# Patient Record
Sex: MALE | Race: WHITE | NOT HISPANIC OR LATINO
[De-identification: names, ages, dates, MRNs, and addresses within clinical notes are randomized per-mention and may not be internally consistent; named-entity substitution may affect disease eponyms.]

---

## 2020-08-14 ENCOUNTER — TRANSCRIPTION ENCOUNTER (OUTPATIENT)
Age: 50
End: 2020-08-14

## 2020-09-22 ENCOUNTER — INPATIENT (INPATIENT)
Facility: HOSPITAL | Age: 50
LOS: 7 days | Discharge: HOME | DRG: 385 | End: 2020-09-30
Attending: HOSPITALIST | Admitting: STUDENT IN AN ORGANIZED HEALTH CARE EDUCATION/TRAINING PROGRAM
Payer: MEDICARE

## 2020-09-22 VITALS
WEIGHT: 179.9 LBS | SYSTOLIC BLOOD PRESSURE: 120 MMHG | DIASTOLIC BLOOD PRESSURE: 75 MMHG | TEMPERATURE: 99 F | RESPIRATION RATE: 18 BRPM | HEIGHT: 72 IN | HEART RATE: 85 BPM | OXYGEN SATURATION: 99 %

## 2020-09-22 LAB
ALBUMIN SERPL ELPH-MCNC: 3.2 G/DL — LOW (ref 3.3–5)
ALP SERPL-CCNC: 38 U/L — LOW (ref 40–120)
ALT FLD-CCNC: 19 U/L — SIGNIFICANT CHANGE UP (ref 10–45)
ANION GAP SERPL CALC-SCNC: 12 MMOL/L — SIGNIFICANT CHANGE UP (ref 5–17)
APTT BLD: 30 SEC — SIGNIFICANT CHANGE UP (ref 27.5–35.5)
AST SERPL-CCNC: 11 U/L — SIGNIFICANT CHANGE UP (ref 10–40)
BASE EXCESS BLDV CALC-SCNC: 1.3 MMOL/L — SIGNIFICANT CHANGE UP (ref -2–2)
BASE EXCESS BLDV CALC-SCNC: 5.3 MMOL/L — HIGH (ref -2–2)
BASOPHILS # BLD AUTO: 0 K/UL — SIGNIFICANT CHANGE UP (ref 0–0.2)
BASOPHILS NFR BLD AUTO: 0 % — SIGNIFICANT CHANGE UP (ref 0–2)
BILIRUB SERPL-MCNC: 0.6 MG/DL — SIGNIFICANT CHANGE UP (ref 0.2–1.2)
BUN SERPL-MCNC: 21 MG/DL — SIGNIFICANT CHANGE UP (ref 7–23)
CA-I SERPL-SCNC: 1.19 MMOL/L — SIGNIFICANT CHANGE UP (ref 1.12–1.3)
CA-I SERPL-SCNC: 1.2 MMOL/L — SIGNIFICANT CHANGE UP (ref 1.12–1.3)
CALCIUM SERPL-MCNC: 9.4 MG/DL — SIGNIFICANT CHANGE UP (ref 8.4–10.5)
CHLORIDE BLDV-SCNC: 96 MMOL/L — SIGNIFICANT CHANGE UP (ref 96–108)
CHLORIDE BLDV-SCNC: 97 MMOL/L — SIGNIFICANT CHANGE UP (ref 96–108)
CHLORIDE SERPL-SCNC: 95 MMOL/L — LOW (ref 96–108)
CO2 BLDV-SCNC: 29 MMOL/L — SIGNIFICANT CHANGE UP (ref 22–30)
CO2 BLDV-SCNC: 34 MMOL/L — HIGH (ref 22–30)
CO2 SERPL-SCNC: 28 MMOL/L — SIGNIFICANT CHANGE UP (ref 22–31)
CREAT SERPL-MCNC: 1.2 MG/DL — SIGNIFICANT CHANGE UP (ref 0.5–1.3)
EOSINOPHIL # BLD AUTO: 0 K/UL — SIGNIFICANT CHANGE UP (ref 0–0.5)
EOSINOPHIL NFR BLD AUTO: 0 % — SIGNIFICANT CHANGE UP (ref 0–6)
GAS PNL BLDV: 129 MMOL/L — LOW (ref 135–145)
GAS PNL BLDV: 129 MMOL/L — LOW (ref 135–145)
GAS PNL BLDV: SIGNIFICANT CHANGE UP
GLUCOSE BLDV-MCNC: 101 MG/DL — HIGH (ref 70–99)
GLUCOSE BLDV-MCNC: 119 MG/DL — HIGH (ref 70–99)
GLUCOSE SERPL-MCNC: 117 MG/DL — HIGH (ref 70–99)
HCO3 BLDV-SCNC: 28 MMOL/L — SIGNIFICANT CHANGE UP (ref 21–29)
HCO3 BLDV-SCNC: 32 MMOL/L — HIGH (ref 21–29)
HCT VFR BLD CALC: 35.8 % — LOW (ref 39–50)
HCT VFR BLDA CALC: 35 % — LOW (ref 39–50)
HCT VFR BLDA CALC: 36 % — LOW (ref 39–50)
HGB BLD CALC-MCNC: 11.4 G/DL — LOW (ref 13–17)
HGB BLD CALC-MCNC: 11.5 G/DL — LOW (ref 13–17)
HGB BLD-MCNC: 11 G/DL — LOW (ref 13–17)
HOROWITZ INDEX BLDV+IHG-RTO: SIGNIFICANT CHANGE UP
INR BLD: 1.36 RATIO — HIGH (ref 0.88–1.16)
LACTATE BLDV-MCNC: 2.6 MMOL/L — HIGH (ref 0.7–2)
LACTATE BLDV-MCNC: 3.6 MMOL/L — HIGH (ref 0.7–2)
LIDOCAIN IGE QN: 14 U/L — SIGNIFICANT CHANGE UP (ref 7–60)
LYMPHOCYTES # BLD AUTO: 0.62 K/UL — LOW (ref 1–3.3)
LYMPHOCYTES # BLD AUTO: 6 % — LOW (ref 13–44)
MANUAL SMEAR VERIFICATION: SIGNIFICANT CHANGE UP
MCHC RBC-ENTMCNC: 26.8 PG — LOW (ref 27–34)
MCHC RBC-ENTMCNC: 30.7 GM/DL — LOW (ref 32–36)
MCV RBC AUTO: 87.1 FL — SIGNIFICANT CHANGE UP (ref 80–100)
MONOCYTES # BLD AUTO: 1.23 K/UL — HIGH (ref 0–0.9)
MONOCYTES NFR BLD AUTO: 12 % — SIGNIFICANT CHANGE UP (ref 2–14)
NEUTROPHILS # BLD AUTO: 8.21 K/UL — HIGH (ref 1.8–7.4)
NEUTROPHILS NFR BLD AUTO: 65 % — SIGNIFICANT CHANGE UP (ref 43–77)
NEUTS BAND # BLD: 15 % — HIGH (ref 0–8)
NRBC # BLD: 0 /100 — SIGNIFICANT CHANGE UP (ref 0–0)
OTHER CELLS CSF MANUAL: 6 ML/DL — LOW (ref 18–22)
PCO2 BLDV: 56 MMHG — HIGH (ref 35–50)
PCO2 BLDV: 58 MMHG — HIGH (ref 35–50)
PH BLDV: 7.32 — LOW (ref 7.35–7.45)
PH BLDV: 7.35 — SIGNIFICANT CHANGE UP (ref 7.35–7.45)
PLAT MORPH BLD: NORMAL — SIGNIFICANT CHANGE UP
PLATELET # BLD AUTO: 452 K/UL — HIGH (ref 150–400)
PO2 BLDV: 29 MMHG — SIGNIFICANT CHANGE UP (ref 25–45)
PO2 BLDV: <20 MMHG — LOW (ref 25–45)
POTASSIUM BLDV-SCNC: 3.7 MMOL/L — SIGNIFICANT CHANGE UP (ref 3.5–5.3)
POTASSIUM BLDV-SCNC: 5.2 MMOL/L — SIGNIFICANT CHANGE UP (ref 3.5–5.3)
POTASSIUM SERPL-MCNC: 5.8 MMOL/L — HIGH (ref 3.5–5.3)
POTASSIUM SERPL-SCNC: 5.8 MMOL/L — HIGH (ref 3.5–5.3)
PROT SERPL-MCNC: 6.2 G/DL — SIGNIFICANT CHANGE UP (ref 6–8.3)
PROTHROM AB SERPL-ACNC: 16 SEC — HIGH (ref 10.6–13.6)
RBC # BLD: 4.11 M/UL — LOW (ref 4.2–5.8)
RBC # FLD: 17.6 % — HIGH (ref 10.3–14.5)
RBC BLD AUTO: SIGNIFICANT CHANGE UP
SAO2 % BLDV: 19 % — LOW (ref 67–88)
SAO2 % BLDV: 41 % — LOW (ref 67–88)
SODIUM SERPL-SCNC: 135 MMOL/L — SIGNIFICANT CHANGE UP (ref 135–145)
VARIANT LYMPHS # BLD: 2 % — SIGNIFICANT CHANGE UP (ref 0–6)
WBC # BLD: 10.26 K/UL — SIGNIFICANT CHANGE UP (ref 3.8–10.5)
WBC # FLD AUTO: 10.26 K/UL — SIGNIFICANT CHANGE UP (ref 3.8–10.5)

## 2020-09-22 PROCEDURE — 99285 EMERGENCY DEPT VISIT HI MDM: CPT | Mod: CS,GC

## 2020-09-22 PROCEDURE — 93010 ELECTROCARDIOGRAM REPORT: CPT

## 2020-09-22 PROCEDURE — 71045 X-RAY EXAM CHEST 1 VIEW: CPT | Mod: 26

## 2020-09-22 PROCEDURE — 74177 CT ABD & PELVIS W/CONTRAST: CPT | Mod: 26

## 2020-09-22 RX ORDER — METRONIDAZOLE 500 MG
500 TABLET ORAL ONCE
Refills: 0 | Status: COMPLETED | OUTPATIENT
Start: 2020-09-22 | End: 2020-09-22

## 2020-09-22 RX ORDER — SODIUM CHLORIDE 9 MG/ML
1000 INJECTION, SOLUTION INTRAVENOUS ONCE
Refills: 0 | Status: COMPLETED | OUTPATIENT
Start: 2020-09-22 | End: 2020-09-22

## 2020-09-22 RX ORDER — ACETAMINOPHEN 500 MG
975 TABLET ORAL ONCE
Refills: 0 | Status: COMPLETED | OUTPATIENT
Start: 2020-09-22 | End: 2020-09-22

## 2020-09-22 RX ORDER — CIPROFLOXACIN LACTATE 400MG/40ML
400 VIAL (ML) INTRAVENOUS ONCE
Refills: 0 | Status: COMPLETED | OUTPATIENT
Start: 2020-09-22 | End: 2020-09-23

## 2020-09-22 RX ORDER — MORPHINE SULFATE 50 MG/1
4 CAPSULE, EXTENDED RELEASE ORAL ONCE
Refills: 0 | Status: DISCONTINUED | OUTPATIENT
Start: 2020-09-22 | End: 2020-09-22

## 2020-09-22 RX ORDER — SODIUM CHLORIDE 9 MG/ML
1000 INJECTION INTRAMUSCULAR; INTRAVENOUS; SUBCUTANEOUS ONCE
Refills: 0 | Status: COMPLETED | OUTPATIENT
Start: 2020-09-22 | End: 2020-09-22

## 2020-09-22 RX ORDER — MORPHINE SULFATE 50 MG/1
2 CAPSULE, EXTENDED RELEASE ORAL ONCE
Refills: 0 | Status: DISCONTINUED | OUTPATIENT
Start: 2020-09-22 | End: 2020-09-22

## 2020-09-22 RX ADMIN — Medication 100 MILLIGRAM(S): at 23:09

## 2020-09-22 RX ADMIN — MORPHINE SULFATE 2 MILLIGRAM(S): 50 CAPSULE, EXTENDED RELEASE ORAL at 23:09

## 2020-09-22 RX ADMIN — SODIUM CHLORIDE 1000 MILLILITER(S): 9 INJECTION INTRAMUSCULAR; INTRAVENOUS; SUBCUTANEOUS at 20:47

## 2020-09-22 RX ADMIN — Medication 975 MILLIGRAM(S): at 23:04

## 2020-09-22 RX ADMIN — MORPHINE SULFATE 4 MILLIGRAM(S): 50 CAPSULE, EXTENDED RELEASE ORAL at 23:05

## 2020-09-22 RX ADMIN — MORPHINE SULFATE 4 MILLIGRAM(S): 50 CAPSULE, EXTENDED RELEASE ORAL at 20:07

## 2020-09-22 RX ADMIN — Medication 975 MILLIGRAM(S): at 19:45

## 2020-09-22 RX ADMIN — SODIUM CHLORIDE 1000 MILLILITER(S): 9 INJECTION, SOLUTION INTRAVENOUS at 20:07

## 2020-09-22 NOTE — ED PROVIDER NOTE - PHYSICAL EXAMINATION
PHYSICAL EXAM:  GENERAL: awake, alert, anxious, in moderate distress   HEAD:  Atraumatic  EYES: EOMI, PERRLA, conjunctiva and sclera clear  ENT: Dry mucous membranes  NECK: Supple  CHEST/LUNG: Clear to auscultation bilaterally; No rales  HEART: Regular rate and rhythm; No murmurs  ABDOMEN: Bowel sounds present; Soft, +b/l lower abdominal tenderness. No rebound/guarding   EXTREMITIES:  2+ Peripheral Pulses, brisk capillary refill. No edema   NERVOUS SYSTEM:  Alert & Oriented X3, speech clear. No deficits   MSK: FROM all 4 extremities, full and equal strength  SKIN: No rashes

## 2020-09-22 NOTE — ED PROVIDER NOTE - CLINICAL SUMMARY MEDICAL DECISION MAKING FREE TEXT BOX
This is a 50M hx ulcerative colitis, with lower abdominal pain x 1.5 months xelganz, prednisone 40mg qd (has been taking x 3 weeks), and oxycodone presenting with abdominal pain after being discharged recently for UC flare. Abdominal exam tenderness lower abdomen b/l, no rebound or guarding, hemodynamically stable. Will do full workup - CBC, CMP, VBG, EKG, CXR, CT abdomen/pelvis. NS 2L. Tylenol 975 PO and Morphine 4 mg IV for pain. Re-assess after meds and labs/imaging This is a 50M hx ulcerative colitis, with lower abdominal pain x 1.5 months xelganz, prednisone 40mg qd (has been taking x 3 weeks), and oxycodone presenting with abdominal pain after being discharged recently for UC flare. Abdominal exam tenderness lower abdomen b/l, no rebound or guarding, hemodynamically stable. Will do full workup - CBC, CMP, VBG, EKG, CXR, CT abdomen/pelvis. NS 2L. Tylenol 975 PO and Morphine 4 mg IV for pain. Re-assess after meds and labs/imaging. CT abdomen/pelvis ?concern for toxic megacolon, Surgery consulted, no acute intervention at this time.

## 2020-09-22 NOTE — ED PROVIDER NOTE - ATTENDING CONTRIBUTION TO CARE
RGUJRAL 51yo male hx CAD s/p PCI x3, Ulcerative colitis presents with abdominal pain. Patient was recently admitted to [facility] for similar symptoms, was admitted and discharged with steroids. States symptoms have not improved. He has decreased appetite and intake. Now feels dizzy and lightheaded. + Diarrhea with blood in stool since July. No hx blood transfusion. No fever, chills.   On exam, Patient is awake, alert and oriented x 3.  Patient is well appearing and in no acute distress.  NCAT  Dry MMM  Lungs are CTA B/L,+S1S2 no murmurs,  Abdomen:Soft nd/+L>R tenderness +bs no rebound or guarding.  Extremity no edema or calf tender.  Skin with no rash.  Neuro CN3-12 intact. Strength 5/5 in upper and lower extremities. Nml Sensation.Gait normal.   Check labs, CT. IVF and pain control and re eval. Likely admission for pain control and hydration.

## 2020-09-22 NOTE — ED PROVIDER NOTE - RAPID ASSESSMENT
50M hx ulcerative colitis, with lower abdominal pain x 1.5 months xelganz, prednisone 40mg qd (has been taking x 3 weeks), oxycodone. Was in hospital last week and release last tuesday [facility]. no fevers. bloody loose stools 2 x/day. no vomiting. no nausea. No dysuria. No changes in urination. today 830 am 5mg. Spoke to GI doctor who recommended he return to hospital as he is not improving and may have continued UC flare. No chest pain or sob. Complains of dizziness. Decreased appetite.     Gi: jhony taylor  cardiologist: yelena at [facility]  pmd: Verivue calli  allergies: penicillin (angioedema?)  Psxh: tonsillectomy  PMhx: MI 2014 s/p stents x 3 Becerril DO: 50M hx ulcerative colitis, with lower abdominal pain x 1.5 months xelganz, prednisone 40mg qd (has been taking x 3 weeks), oxycodone. Was in hospital last week and release last tuesday [facility]. no fevers. bloody loose stools 2 x/day. no vomiting. no nausea. No dysuria. No changes in urination. today 830 am 5mg. Spoke to GI doctor who recommended he return to hospital as he is not improving and may have continued UC flare. No chest pain or sob. Complains of dizziness. Decreased appetite.     Gi: jhony taylor  cardiologist: yelena at [facility]  pmd: [email or web address]  allergies: penicillin (angioedema?)  Psxh: tonsillectomy  PMhx: MI 2014 s/p stents x 3    nonsurgical abdomen on assessment, no rebound/guarding, ttp b/l lower quadrants, plan for labs, ct a/p to eval for complications of UC flare, tylenol and will likely need further pain control/ fluids once back in main ED. Becerril DO: 50M hx ulcerative colitis, with lower abdominal pain x 1.5 months xelganz, prednisone 40mg qd (has been taking x 3 weeks), oxycodone. Was in hospital last week and release last tuesday [facility]. no fevers. bloody loose stools 2 x/day. no vomiting. no nausea. No dysuria. No changes in urination. today 830 am 5mg. Spoke to GI doctor who recommended he return to hospital as he is not improving and may have continued UC flare. No chest pain or sob. Complains of dizziness. Decreased appetite.     Gi: jhony taylor  cardiologist: yelena at [facility]  pmd: [email or web address]  allergies: penicillin (angioedema?)  Psxh: tonsillectomy  PMhx: MI 2014 s/p stents x 3    nonsurgical abdomen on assessment, no rebound/guarding, ttp b/l lower quadrants, plan for labs, ct a/p to eval for complications of UC flare, tylenol and will likely need further pain control/ fluids once back in main ED. FS 92. conjunctiva pink. pt complaining of dizziness, [facility] Becerril DO: 50M hx ulcerative colitis, with lower abdominal pain x 1.5 months xelganz, prednisone 40mg qd (has been taking x 3 weeks), and oxycodone. Was in hospital last week for UC flare and released last tuesday from [facility]. no fevers. bloody loose stools 2 x/day. no vomiting. no nausea. No dysuria. No changes in urination. last oxycodone today 830 am 5mg. Spoke to GI doctor who recommended he return to hospital as he is not improving and may have continued UC flare. No chest pain or sob. Complains of dizziness. Decreased appetite.     Gi: jhony taylor  cardiologist: yelena at [facility]  pmd: [email or web address]  allergies: penicillin (angioedema?)  Psxh: tonsillectomy  PMhx: MI 2014 s/p stents x 3    nonsurgical abdomen on assessment, no rebound/guarding, ttp b/l lower quadrants, plan for labs, ct a/p to eval for complications of UC flare, tylenol and will likely need further pain control/ fluids once back in main ED. FS 92. conjunctiva pink. pt complaining of dizziness, spoke to main to expedite placement in maind ED

## 2020-09-22 NOTE — CONSULT NOTE ADULT - ASSESSMENT
50M hx MI s/p MAGDA x3 (2014), ulcerative colitis (on Xeljanz), with lower abdominal pain x 6 weeks presenting with likely UC flare    - No urgent surgical intervention indicated at this time  - Patient remains symptomatic despite treatment with 3 different biologic agents and steroids  - UC refractory to maximal medical therapy would likely benefit from proctocolectomy however ideally would be performed electively, without active inflammation  - Recommend GI consultation and medical optimization  - Further recommendations and possible surgery pending clinical course    d/w fellow Dr. Davis on behalf of Dr. Dennison    Colorectal Surgery (Red)  [id number] with questions

## 2020-09-22 NOTE — ED PROVIDER NOTE - NS ED ROS FT
REVIEW OF SYSTEMS:  CONSTITUTIONAL: No fever, chills  EYES: No eye pain  ENMT:  No difficulty hearing, tinnitus, vertigo  NECK: No pain   RESPIRATORY: No cough, wheezing, or hemoptysis; No shortness of breath  CARDIOVASCULAR: No chest pain, palpitations, or leg swelling  GASTROINTESTINAL: +abdominal pain and diarrhea. No nausea, vomiting, or hematemesis  GENITOURINARY: No dysuria  NEUROLOGICAL: No headaches  SKIN: No rashes   MUSCULOSKELETAL: No joint pain

## 2020-09-22 NOTE — ED ADULT NURSE NOTE - OBJECTIVE STATEMENT
The pt is a 49 y/o M PMH UC presenting to the ED c/o suprapubic pain, diarrhea, decreased PO intake x 1.5 monbths The patient reports he was in hospital 1 week ago for UC flare he was advised by his doctor to return to hospital after he has not improved. Upon assessment, pt is AO x 4, speaking in full and complete sentences, s1 s2 heart sounds, abd soft, tender to the suprapubic region, hyperactive bowel sounds present in all four quadrants, equal strength bilaterally, skin warm, dry and intact, present peripheral pulses, PERRL. Pt denies fever, chills, n/v, urinary symptoms, CP, dizziness, weakness, HA, SOB, abd pain. Pt positioned for comfort, appropriate side rails raised, wheels locked, bed in lowest position, pt denies needs at this time, call bell within reach. The pt is a 51 y/o M PMH UC presenting to the ED c/o suprapubic pain, diarrhea, decreased PO intake, bloody stools, 20 bowel movements a day x 1.5 monbths The patient reports he was in hospital 1 week ago for UC flare he was advised by his doctor to return to hospital after he has not improved. Upon assessment, pt is AO x 4, speaking in full and complete sentences, s1 s2 heart sounds, abd soft, tender to the suprapubic region, hyperactive bowel sounds present in all four quadrants, equal strength bilaterally, skin warm, dry and intact, present peripheral pulses, PERRL. Pt denies fever, chills, n/v, urinary symptoms, CP, dizziness, weakness, HA, SOB, abd pain. Pt positioned for comfort, appropriate side rails raised, wheels locked, bed in lowest position, pt denies needs at this time, call bell within reach.

## 2020-09-22 NOTE — ED PROVIDER NOTE - OBJECTIVE STATEMENT
This is a 50M hx ulcerative colitis, with lower abdominal pain x 1.5 months xelganz, prednisone 40mg qd (has been taking x 3 weeks), and oxycodone presenting with abdominal pain. Pt reports that he was discharged from [facility] last week for UC flare. At that time they did a colonoscopy which showed 2/3 of the colon severely inflamed. States that his lower abdominal pain has been constant since discharge, rated 9/10, not relieved with oxycodone.     No fevers, N/V, chest pain, dyspnea, dysuria. Reports 20 episodes of bloody loose stools for the last week. Reports dizziness described as lightheadedness, also has poor PO intake. Spoke to GI doctor who recommended he return to hospital as he is not improving and may have continued UC flare.

## 2020-09-23 ENCOUNTER — TRANSCRIPTION ENCOUNTER (OUTPATIENT)
Age: 50
End: 2020-09-23

## 2020-09-23 DIAGNOSIS — Z29.9 ENCOUNTER FOR PROPHYLACTIC MEASURES, UNSPECIFIED: ICD-10-CM

## 2020-09-23 DIAGNOSIS — K51.919 ULCERATIVE COLITIS, UNSPECIFIED WITH UNSPECIFIED COMPLICATIONS: ICD-10-CM

## 2020-09-23 DIAGNOSIS — D64.9 ANEMIA, UNSPECIFIED: ICD-10-CM

## 2020-09-23 DIAGNOSIS — K51.90 ULCERATIVE COLITIS, UNSPECIFIED, WITHOUT COMPLICATIONS: ICD-10-CM

## 2020-09-23 DIAGNOSIS — I25.10 ATHEROSCLEROTIC HEART DISEASE OF NATIVE CORONARY ARTERY WITHOUT ANGINA PECTORIS: ICD-10-CM

## 2020-09-23 LAB
ALBUMIN SERPL ELPH-MCNC: 2.6 G/DL — LOW (ref 3.3–5)
ALP SERPL-CCNC: 30 U/L — LOW (ref 40–120)
ALT FLD-CCNC: 14 U/L — SIGNIFICANT CHANGE UP (ref 10–45)
ANION GAP SERPL CALC-SCNC: 14 MMOL/L — SIGNIFICANT CHANGE UP (ref 5–17)
ANION GAP SERPL CALC-SCNC: 6 MMOL/L — SIGNIFICANT CHANGE UP (ref 5–17)
APTT BLD: 20.6 SEC — LOW (ref 27.5–35.5)
AST SERPL-CCNC: 8 U/L — LOW (ref 10–40)
BASE EXCESS BLDV CALC-SCNC: 4 MMOL/L — HIGH (ref -2–2)
BASOPHILS # BLD AUTO: 0.03 K/UL — SIGNIFICANT CHANGE UP (ref 0–0.2)
BASOPHILS NFR BLD AUTO: 0.4 % — SIGNIFICANT CHANGE UP (ref 0–2)
BILIRUB SERPL-MCNC: 0.6 MG/DL — SIGNIFICANT CHANGE UP (ref 0.2–1.2)
BUN SERPL-MCNC: 15 MG/DL — SIGNIFICANT CHANGE UP (ref 7–23)
BUN SERPL-MCNC: 21 MG/DL — SIGNIFICANT CHANGE UP (ref 7–23)
C DIFF GDH STL QL: NEGATIVE — SIGNIFICANT CHANGE UP
C DIFF GDH STL QL: SIGNIFICANT CHANGE UP
CA-I SERPL-SCNC: 1.14 MMOL/L — SIGNIFICANT CHANGE UP (ref 1.12–1.3)
CALCIUM SERPL-MCNC: 8.1 MG/DL — LOW (ref 8.4–10.5)
CALCIUM SERPL-MCNC: 8.7 MG/DL — SIGNIFICANT CHANGE UP (ref 8.4–10.5)
CHLORIDE BLDV-SCNC: 99 MMOL/L — SIGNIFICANT CHANGE UP (ref 96–108)
CHLORIDE SERPL-SCNC: 96 MMOL/L — SIGNIFICANT CHANGE UP (ref 96–108)
CHLORIDE SERPL-SCNC: 98 MMOL/L — SIGNIFICANT CHANGE UP (ref 96–108)
CO2 BLDV-SCNC: 29 MMOL/L — SIGNIFICANT CHANGE UP (ref 22–30)
CO2 SERPL-SCNC: 22 MMOL/L — SIGNIFICANT CHANGE UP (ref 22–31)
CO2 SERPL-SCNC: 27 MMOL/L — SIGNIFICANT CHANGE UP (ref 22–31)
CREAT SERPL-MCNC: 1.06 MG/DL — SIGNIFICANT CHANGE UP (ref 0.5–1.3)
CREAT SERPL-MCNC: 1.12 MG/DL — SIGNIFICANT CHANGE UP (ref 0.5–1.3)
CRP SERPL-MCNC: 10.27 MG/DL — HIGH (ref 0–0.4)
CULTURE RESULTS: SIGNIFICANT CHANGE UP
EOSINOPHIL # BLD AUTO: 0.14 K/UL — SIGNIFICANT CHANGE UP (ref 0–0.5)
EOSINOPHIL NFR BLD AUTO: 1.9 % — SIGNIFICANT CHANGE UP (ref 0–6)
ERYTHROCYTE [SEDIMENTATION RATE] IN BLOOD: 79 MM/HR — HIGH (ref 0–20)
GAS PNL BLDV: 129 MMOL/L — LOW (ref 135–145)
GAS PNL BLDV: SIGNIFICANT CHANGE UP
GLUCOSE BLDV-MCNC: 96 MG/DL — SIGNIFICANT CHANGE UP (ref 70–99)
GLUCOSE SERPL-MCNC: 96 MG/DL — SIGNIFICANT CHANGE UP (ref 70–99)
GLUCOSE SERPL-MCNC: 99 MG/DL — SIGNIFICANT CHANGE UP (ref 70–99)
HCO3 BLDV-SCNC: 28 MMOL/L — SIGNIFICANT CHANGE UP (ref 21–29)
HCT VFR BLD CALC: 30.5 % — LOW (ref 39–50)
HCT VFR BLDA CALC: 29 % — LOW (ref 39–50)
HGB BLD CALC-MCNC: 9.5 G/DL — LOW (ref 13–17)
HGB BLD-MCNC: 9.3 G/DL — LOW (ref 13–17)
IMM GRANULOCYTES NFR BLD AUTO: 1.1 % — SIGNIFICANT CHANGE UP (ref 0–1.5)
INR BLD: 1.22 RATIO — HIGH (ref 0.88–1.16)
LACTATE BLDV-MCNC: 1.1 MMOL/L — SIGNIFICANT CHANGE UP (ref 0.7–2)
LACTATE BLDV-MCNC: 1.1 MMOL/L — SIGNIFICANT CHANGE UP (ref 0.7–2)
LYMPHOCYTES # BLD AUTO: 0.61 K/UL — LOW (ref 1–3.3)
LYMPHOCYTES # BLD AUTO: 8.4 % — LOW (ref 13–44)
MAGNESIUM SERPL-MCNC: 1.9 MG/DL — SIGNIFICANT CHANGE UP (ref 1.6–2.6)
MCHC RBC-ENTMCNC: 26.4 PG — LOW (ref 27–34)
MCHC RBC-ENTMCNC: 30.5 GM/DL — LOW (ref 32–36)
MCV RBC AUTO: 86.6 FL — SIGNIFICANT CHANGE UP (ref 80–100)
MONOCYTES # BLD AUTO: 0.77 K/UL — SIGNIFICANT CHANGE UP (ref 0–0.9)
MONOCYTES NFR BLD AUTO: 10.6 % — SIGNIFICANT CHANGE UP (ref 2–14)
NEUTROPHILS # BLD AUTO: 5.64 K/UL — SIGNIFICANT CHANGE UP (ref 1.8–7.4)
NEUTROPHILS NFR BLD AUTO: 77.6 % — HIGH (ref 43–77)
NRBC # BLD: 0 /100 WBCS — SIGNIFICANT CHANGE UP (ref 0–0)
OTHER CELLS CSF MANUAL: 12 ML/DL — LOW (ref 18–22)
PCO2 BLDV: 39 MMHG — SIGNIFICANT CHANGE UP (ref 35–50)
PH BLDV: 7.46 — HIGH (ref 7.35–7.45)
PHOSPHATE SERPL-MCNC: 2 MG/DL — LOW (ref 2.5–4.5)
PLATELET # BLD AUTO: 352 K/UL — SIGNIFICANT CHANGE UP (ref 150–400)
PO2 BLDV: 52 MMHG — HIGH (ref 25–45)
POTASSIUM BLDV-SCNC: 3.8 MMOL/L — SIGNIFICANT CHANGE UP (ref 3.5–5.3)
POTASSIUM SERPL-MCNC: 3.9 MMOL/L — SIGNIFICANT CHANGE UP (ref 3.5–5.3)
POTASSIUM SERPL-MCNC: 4 MMOL/L — SIGNIFICANT CHANGE UP (ref 3.5–5.3)
POTASSIUM SERPL-SCNC: 3.9 MMOL/L — SIGNIFICANT CHANGE UP (ref 3.5–5.3)
POTASSIUM SERPL-SCNC: 4 MMOL/L — SIGNIFICANT CHANGE UP (ref 3.5–5.3)
PROT SERPL-MCNC: 5 G/DL — LOW (ref 6–8.3)
PROTHROM AB SERPL-ACNC: 14.4 SEC — HIGH (ref 10.6–13.6)
RBC # BLD: 3.52 M/UL — LOW (ref 4.2–5.8)
RBC # FLD: 17.9 % — HIGH (ref 10.3–14.5)
SAO2 % BLDV: 88 % — SIGNIFICANT CHANGE UP (ref 67–88)
SARS-COV-2 IGG SERPL QL IA: NEGATIVE — SIGNIFICANT CHANGE UP
SARS-COV-2 IGM SERPL IA-ACNC: <0.1 INDEX — SIGNIFICANT CHANGE UP
SARS-COV-2 RNA SPEC QL NAA+PROBE: SIGNIFICANT CHANGE UP
SODIUM SERPL-SCNC: 131 MMOL/L — LOW (ref 135–145)
SODIUM SERPL-SCNC: 132 MMOL/L — LOW (ref 135–145)
SPECIMEN SOURCE: SIGNIFICANT CHANGE UP
WBC # BLD: 7.27 K/UL — SIGNIFICANT CHANGE UP (ref 3.8–10.5)
WBC # FLD AUTO: 7.27 K/UL — SIGNIFICANT CHANGE UP (ref 3.8–10.5)

## 2020-09-23 PROCEDURE — 99223 1ST HOSP IP/OBS HIGH 75: CPT | Mod: GC

## 2020-09-23 RX ORDER — OXYCODONE HYDROCHLORIDE 5 MG/1
5 TABLET ORAL EVERY 6 HOURS
Refills: 0 | Status: DISCONTINUED | OUTPATIENT
Start: 2020-09-23 | End: 2020-09-24

## 2020-09-23 RX ORDER — OXYCODONE HYDROCHLORIDE 5 MG/1
5 TABLET ORAL EVERY 6 HOURS
Refills: 0 | Status: DISCONTINUED | OUTPATIENT
Start: 2020-09-23 | End: 2020-09-23

## 2020-09-23 RX ORDER — SODIUM CHLORIDE 9 MG/ML
1000 INJECTION, SOLUTION INTRAVENOUS
Refills: 0 | Status: DISCONTINUED | OUTPATIENT
Start: 2020-09-23 | End: 2020-09-28

## 2020-09-23 RX ORDER — HEPARIN SODIUM 5000 [USP'U]/ML
5000 INJECTION INTRAVENOUS; SUBCUTANEOUS EVERY 12 HOURS
Refills: 0 | Status: DISCONTINUED | OUTPATIENT
Start: 2020-09-23 | End: 2020-09-30

## 2020-09-23 RX ORDER — CIPROFLOXACIN LACTATE 400MG/40ML
400 VIAL (ML) INTRAVENOUS EVERY 12 HOURS
Refills: 0 | Status: DISCONTINUED | OUTPATIENT
Start: 2020-09-23 | End: 2020-09-23

## 2020-09-23 RX ORDER — TOFACITINIB 11 MG/1
1 TABLET, FILM COATED, EXTENDED RELEASE ORAL
Qty: 0 | Refills: 0 | DISCHARGE

## 2020-09-23 RX ORDER — MORPHINE SULFATE 50 MG/1
4 CAPSULE, EXTENDED RELEASE ORAL EVERY 6 HOURS
Refills: 0 | Status: DISCONTINUED | OUTPATIENT
Start: 2020-09-23 | End: 2020-09-23

## 2020-09-23 RX ORDER — TOFACITINIB 11 MG/1
10 TABLET, FILM COATED, EXTENDED RELEASE ORAL
Refills: 0 | Status: DISCONTINUED | OUTPATIENT
Start: 2020-09-23 | End: 2020-09-24

## 2020-09-23 RX ORDER — METRONIDAZOLE 500 MG
500 TABLET ORAL EVERY 8 HOURS
Refills: 0 | Status: DISCONTINUED | OUTPATIENT
Start: 2020-09-23 | End: 2020-09-23

## 2020-09-23 RX ORDER — ASPIRIN/CALCIUM CARB/MAGNESIUM 324 MG
81 TABLET ORAL DAILY
Refills: 0 | Status: DISCONTINUED | OUTPATIENT
Start: 2020-09-23 | End: 2020-09-30

## 2020-09-23 RX ORDER — SODIUM CHLORIDE 9 MG/ML
1000 INJECTION, SOLUTION INTRAVENOUS
Refills: 0 | Status: DISCONTINUED | OUTPATIENT
Start: 2020-09-23 | End: 2020-09-23

## 2020-09-23 RX ORDER — MORPHINE SULFATE 50 MG/1
4 CAPSULE, EXTENDED RELEASE ORAL EVERY 6 HOURS
Refills: 0 | Status: DISCONTINUED | OUTPATIENT
Start: 2020-09-23 | End: 2020-09-24

## 2020-09-23 RX ORDER — LANOLIN ALCOHOL/MO/W.PET/CERES
3 CREAM (GRAM) TOPICAL AT BEDTIME
Refills: 0 | Status: DISCONTINUED | OUTPATIENT
Start: 2020-09-23 | End: 2020-09-30

## 2020-09-23 RX ORDER — ASPIRIN/CALCIUM CARB/MAGNESIUM 324 MG
0 TABLET ORAL
Qty: 0 | Refills: 0 | DISCHARGE

## 2020-09-23 RX ORDER — SODIUM CHLORIDE 9 MG/ML
1000 INJECTION INTRAMUSCULAR; INTRAVENOUS; SUBCUTANEOUS ONCE
Refills: 0 | Status: COMPLETED | OUTPATIENT
Start: 2020-09-23 | End: 2020-09-23

## 2020-09-23 RX ORDER — ENOXAPARIN SODIUM 100 MG/ML
40 INJECTION SUBCUTANEOUS DAILY
Refills: 0 | Status: DISCONTINUED | OUTPATIENT
Start: 2020-09-23 | End: 2020-09-23

## 2020-09-23 RX ORDER — INFLUENZA VIRUS VACCINE 15; 15; 15; 15 UG/.5ML; UG/.5ML; UG/.5ML; UG/.5ML
0.5 SUSPENSION INTRAMUSCULAR ONCE
Refills: 0 | Status: DISCONTINUED | OUTPATIENT
Start: 2020-09-23 | End: 2020-09-30

## 2020-09-23 RX ADMIN — MORPHINE SULFATE 4 MILLIGRAM(S): 50 CAPSULE, EXTENDED RELEASE ORAL at 21:21

## 2020-09-23 RX ADMIN — Medication 20 MILLIGRAM(S): at 17:03

## 2020-09-23 RX ADMIN — MORPHINE SULFATE 4 MILLIGRAM(S): 50 CAPSULE, EXTENDED RELEASE ORAL at 04:22

## 2020-09-23 RX ADMIN — Medication 20 MILLIGRAM(S): at 17:02

## 2020-09-23 RX ADMIN — HEPARIN SODIUM 5000 UNIT(S): 5000 INJECTION INTRAVENOUS; SUBCUTANEOUS at 17:37

## 2020-09-23 RX ADMIN — SODIUM CHLORIDE 100 MILLILITER(S): 9 INJECTION, SOLUTION INTRAVENOUS at 14:41

## 2020-09-23 RX ADMIN — TOFACITINIB 10 MILLIGRAM(S): 11 TABLET, FILM COATED, EXTENDED RELEASE ORAL at 17:38

## 2020-09-23 RX ADMIN — MORPHINE SULFATE 4 MILLIGRAM(S): 50 CAPSULE, EXTENDED RELEASE ORAL at 13:10

## 2020-09-23 RX ADMIN — SODIUM CHLORIDE 100 MILLILITER(S): 9 INJECTION, SOLUTION INTRAVENOUS at 21:13

## 2020-09-23 RX ADMIN — MORPHINE SULFATE 4 MILLIGRAM(S): 50 CAPSULE, EXTENDED RELEASE ORAL at 12:58

## 2020-09-23 RX ADMIN — Medication 100 MILLIGRAM(S): at 14:41

## 2020-09-23 RX ADMIN — MORPHINE SULFATE 4 MILLIGRAM(S): 50 CAPSULE, EXTENDED RELEASE ORAL at 22:51

## 2020-09-23 RX ADMIN — Medication 20 MILLIGRAM(S): at 21:12

## 2020-09-23 RX ADMIN — Medication 200 MILLIGRAM(S): at 01:13

## 2020-09-23 RX ADMIN — Medication 40 MILLIGRAM(S): at 12:59

## 2020-09-23 RX ADMIN — OXYCODONE HYDROCHLORIDE 5 MILLIGRAM(S): 5 TABLET ORAL at 17:02

## 2020-09-23 RX ADMIN — OXYCODONE HYDROCHLORIDE 5 MILLIGRAM(S): 5 TABLET ORAL at 08:45

## 2020-09-23 RX ADMIN — MORPHINE SULFATE 2 MILLIGRAM(S): 50 CAPSULE, EXTENDED RELEASE ORAL at 01:13

## 2020-09-23 RX ADMIN — SODIUM CHLORIDE 1000 MILLILITER(S): 9 INJECTION INTRAMUSCULAR; INTRAVENOUS; SUBCUTANEOUS at 01:13

## 2020-09-23 RX ADMIN — Medication 81 MILLIGRAM(S): at 21:12

## 2020-09-23 RX ADMIN — Medication 3 MILLIGRAM(S): at 22:16

## 2020-09-23 RX ADMIN — OXYCODONE HYDROCHLORIDE 5 MILLIGRAM(S): 5 TABLET ORAL at 17:10

## 2020-09-23 RX ADMIN — OXYCODONE HYDROCHLORIDE 5 MILLIGRAM(S): 5 TABLET ORAL at 08:30

## 2020-09-23 NOTE — H&P ADULT - NSHPREVIEWOFSYSTEMS_GEN_ALL_CORE
REVIEW OF SYSTEMS:    CONSTITUTIONAL: No weakness, fevers or chills, + weight loss  EYES/ENT: No visual changes;  No vertigo or throat pain   NECK: No pain or stiffness  RESPIRATORY: No cough, wheezing, hemoptysis; No shortness of breath  CARDIOVASCULAR: No chest pain or palpitations  GASTROINTESTINAL: + abdominal pain. No nausea, vomiting, or hematemesis; + diarrhea or constipation. No melena. + hematochezia.  GENITOURINARY: No dysuria, frequency or hematuria  NEUROLOGICAL: No numbness or weakness  SKIN: No itching, rashes  psych: no anxiety or depression

## 2020-09-23 NOTE — H&P ADULT - NSICDXPASTMEDICALHX_GEN_ALL_CORE_FT
PAST MEDICAL HISTORY:  CAD (coronary artery disease)     MI (myocardial infarction)     Ulcerative colitis

## 2020-09-23 NOTE — DISCHARGE NOTE NURSING/CASE MANAGEMENT/SOCIAL WORK - NSDCPNINST_GEN_ALL_CORE
Call MD/911/ Go to emergency room if you have any chest pain, SOB, fevers, bleeding, severe abdominal pain, constipation, or blood in your stool. Or any change in status at all. Be sure to take medications as prescribed and follow up with MD as instructed.

## 2020-09-23 NOTE — ED ADULT NURSE REASSESSMENT NOTE - NS ED NURSE REASSESS COMMENT FT1
Pt updated on plan of care, clicked RTM awaiting inpatient bed. Pt positioned for comfort, wheels locked, bed in lowest position, appropriate side rails raised, call bell within reach.

## 2020-09-23 NOTE — H&P ADULT - NSHPSOCIALHISTORY_GEN_ALL_CORE
Never smoked  No alcohol or illicit drug use  Lives with wife and son  former [employer] officer, retired in 2014

## 2020-09-23 NOTE — CONSULT NOTE ADULT - ATTENDING COMMENTS
Ulcerative colitis with prolonged flare  -A long discussion with the patient on September 24, about the surgical intervention for ulcerative colitis. We discussed doing 3 stage procedures for total proctocolectomy with J-pouch creation. Given patient's current active flare and high steroid dependency, he would clearly require a 3 stage procedure. We discussed risks and benefits at length including bleeding, infection, risk of injury to nearby structures, possible anastomotic dehiscence, possible pouch failure and possible Crohn's conversion. We also discussed treatment options without proceeding with surgery including additional biologic therapy.  -Given patient's prolonged active inflammation, I believe he would be a good candidate for the initial stage of surgery being laparoscopic-assisted total abnormal colectomy.  This would ultimately be followed by completion proctectomy with J-pouch creation and ileostomy reversal  -All questions were answered  -Patient will consider his options and discuss further with his family and gastroenterology  -We will continue to monitor closely and be available to discuss any additional questions
Differential diagnosis and plan of care discussed with patient after the evaluation  75 Minutes spent on total encounter of which more than fifty percent of the encounter was spent counseling and/or coordinating care by the attending physician.  Advanced care planning was discussed with the patient and/or surrogate decision makers. Advanced care planning forms were discussed. The risks benefits and alternatives to pertinent gastrointestinal procedures and interventions were discussed in detail and all questions were answered. Duration: 30 Minutes.      Brandon Gray M.D.   Gastroenterology and Hepatology  Cell: [phone]

## 2020-09-23 NOTE — PROGRESS NOTE ADULT - PROBLEM SELECTOR PLAN 2
MAGDA x 3 (2014)  -c/w ASA 81mg MI s/p MAGDA x 3 (2014). Stable. Not on other anticoagulants or antiplatelets.  -c/w ASA 81mg MI s/p MAGDA x 3 (2014). Stable. Not on other anticoagulants or antiplatelets.  - d/w GI regarding any impending procedure, otherwise c/w ASA 81mg MI s/p MAGDA x 3 (2014). Stable. Not on other anticoagulants or antiplatelets.  - no impending procedure for now, c/w ASA 81mg

## 2020-09-23 NOTE — PROGRESS NOTE ADULT - SUBJECTIVE AND OBJECTIVE BOX
Rashel Blake, PGY1  Internal Medicine  Pager #: [phone]    Patient is a 50y old  Male who presents with a chief complaint of abdominal pain (23 Sep 2020 07:24)      SUBJECTIVE / OVERNIGHT EVENTS:  ADDITIONAL REVIEW OF SYSTEMS: 10 point ROS negative except as stated per HPI.    MEDICATIONS  (STANDING):  ciprofloxacin   IVPB 400 milliGRAM(s) IV Intermittent every 12 hours  enoxaparin Injectable 40 milliGRAM(s) SubCutaneous daily  influenza   Vaccine 0.5 milliLiter(s) IntraMuscular once  lactated ringers. 1000 milliLiter(s) (75 mL/Hr) IV Continuous <Continuous>  metroNIDAZOLE  IVPB 500 milliGRAM(s) IV Intermittent every 8 hours  predniSONE   Tablet 40 milliGRAM(s) Oral daily    MEDICATIONS  (PRN):  morphine  - Injectable 4 milliGRAM(s) IV Push every 6 hours PRN Severe Pain (7 - 10)      CAPILLARY BLOOD GLUCOSE      POCT Blood Glucose.: 92 mg/dL (22 Sep 2020 19:11)    I&O's Summary      PHYSICAL EXAM:  Vital Signs Last 24 Hrs  T(C): 36.9 (23 Sep 2020 05:27), Max: 37.1 (22 Sep 2020 16:01)  T(F): 98.5 (23 Sep 2020 05:27), Max: 98.8 (22 Sep 2020 16:01)  HR: 77 (23 Sep 2020 05:27) (62 - 85)  BP: 117/70 (23 Sep 2020 05:27) (115/68 - 147/79)  BP(mean): --  RR: 18 (23 Sep 2020 05:27) (18 - 18)  SpO2: 99% (23 Sep 2020 05:27) (99% - 100%)  CONSTITUTIONAL: NAD, lying in bed comfortably  EYES: EOMI, PERRLA; conjunctiva and sclera clear  ENMT: Moist oral mucosa; normal dentition  NECK: Supple, no palpable masses  RESPIRATORY: Lungs clear to ascultation b/l; No rales, ronchi, or wheezing; Unlabored respirations  CARDIOVASCULAR: Regular rate and rhythm, normal S1 and S2, no murmurs, rubs, or gallops  ABDOMEN: Soft, nontender, nondistended, normal bowel sounds  MUSCULOSKELETAL: No joint swelling or tenderness to palpation  PSYCH: Affect appropriate  NEUROLOGY: AAOx3, CNs grossly intact  SKIN: No rashes; no palpable lesions    LABS:                        11.0   10.26 )-----------( 452      ( 22 Sep 2020 19:22 )             35.8     09-22    132<L>  |  96  |  21  ----------------------------<  99  4.0   |  22  |  1.12    Ca    8.7      22 Sep 2020 23:25    TPro  6.2  /  Alb  3.2<L>  /  TBili  0.6  /  DBili  x   /  AST  11  /  ALT  19  /  AlkPhos  38<L>  09-22    PT/INR - ( 22 Sep 2020 19:22 )   PT: 16.0 sec;   INR: 1.36 ratio         PTT - ( 22 Sep 2020 19:22 )  PTT:30.0 sec            RADIOLOGY & ADDITIONAL TESTS: Rashel Blake, PGY1  Internal Medicine  Pager #: [phone]    Patient is a 50y old  Male who presents with a chief complaint of abdominal pain (23 Sep 2020 07:24)    SUBJECTIVE / OVERNIGHT EVENTS: Admitted overnight. Endorses ongoing abdominal pain, severely frequent diarrhea with hematochezia. Also reports increased abdominal distension and rectal spasms.  Reports similar symptoms ongoing prior to [facility] admission where CT abdomen & colonoscopy were performed, no med changes; reports symptoms did not improve during or after that admission. Reports he has been on Xeljans for several weeks with prednisone.     ADDITIONAL REVIEW OF SYSTEMS: 10 point ROS negative except as stated per HPI.    MEDICATIONS  (STANDING):  ciprofloxacin   IVPB 400 milliGRAM(s) IV Intermittent every 12 hours  enoxaparin Injectable 40 milliGRAM(s) SubCutaneous daily  influenza   Vaccine 0.5 milliLiter(s) IntraMuscular once  lactated ringers. 1000 milliLiter(s) (75 mL/Hr) IV Continuous <Continuous>  metroNIDAZOLE  IVPB 500 milliGRAM(s) IV Intermittent every 8 hours  predniSONE   Tablet 40 milliGRAM(s) Oral daily    MEDICATIONS  (PRN):  morphine  - Injectable 4 milliGRAM(s) IV Push every 6 hours PRN Severe Pain (7 - 10)    CAPILLARY BLOOD GLUCOSE      POCT Blood Glucose.: 92 mg/dL (22 Sep 2020 19:11)    I&O's Summary      PHYSICAL EXAM:  Vital Signs Last 24 Hrs  T(C): 36.9 (23 Sep 2020 05:27), Max: 37.1 (22 Sep 2020 16:01)  T(F): 98.5 (23 Sep 2020 05:27), Max: 98.8 (22 Sep 2020 16:01)  HR: 77 (23 Sep 2020 05:27) (62 - 85)  BP: 117/70 (23 Sep 2020 05:27) (115/68 - 147/79)  BP(mean): --  RR: 18 (23 Sep 2020 05:27) (18 - 18)  SpO2: 99% (23 Sep 2020 05:27) (99% - 100%)  CONSTITUTIONAL: NAD, lying in bed, appears uncomfortable  EYES: EOMI, PERRLA; conjunctiva and sclera clear  ENMT: Moist oral mucosa; normal dentition  NECK: Supple, no palpable masses  RESPIRATORY: Lungs clear to ascultation b/l; No rales, ronchi, or wheezing; Unlabored respirations  CARDIOVASCULAR: Regular rate and rhythm, normal S1 and S2, no murmurs, rubs, or gallops  ABDOMEN: Soft; mildly distended; tender to palpation at suprapubic, periumbilical regions with guarding; no rebound; normal bowel sounds  MUSCULOSKELETAL: No joint swelling or tenderness to palpation  PSYCH: Affect appropriate  NEUROLOGY: AAOx3, CNs grossly intact  SKIN: No rashes; no palpable lesions    LABS:                        11.0   10.26 )-----------( 452      ( 22 Sep 2020 19:22 )             35.8     09-22    132<L>  |  96  |  21  ----------------------------<  99  4.0   |  22  |  1.12    Ca    8.7      22 Sep 2020 23:25    TPro  6.2  /  Alb  3.2<L>  /  TBili  0.6  /  DBili  x   /  AST  11  /  ALT  19  /  AlkPhos  38<L>  09-22    PT/INR - ( 22 Sep 2020 19:22 )   PT: 16.0 sec;   INR: 1.36 ratio         PTT - ( 22 Sep 2020 19:22 )  PTT:30.0 sec        RADIOLOGY & ADDITIONAL TESTS:    t< from: CT Abdomen and Pelvis w/ IV Cont (09.22.20 @ 20:32) >  FINDINGS:    LOWER CHEST: Right lower lobe calcified granuloma. No pleural effusion.  LIVER: Within normal limits.  BILE DUCTS: Normal caliber.  GALLBLADDER: Within normal limits.  SPLEEN: Within normal limits.  PANCREAS: Within normal limits.  ADRENALS: Within normal limits.    KIDNEYS/URETERS: Within normal limits.  BLADDER: Within normal limits.  REPRODUCTIVE ORGANS: Prostate within normal limits.    BOWEL: Long segment continuous left-sided colonic wall thickening extending from the transverse colon to the rectum with pericolonic inflammatory changes, compatible with colitis. The transverse colon is distended with air up to 6 cm.  No bowel obstruction. Appendix is normal. No free air or abscess.    VESSELS: Atherosclerotic changes.  RETROPERITONEUM/LYMPH NODES: No lymphadenopathy.  ABDOMINAL WALL: Tiny fat-containing umbilical hernia.  BONES: Grade 2 anterolisthesis of L5 on S1 due to bilateral spondylolysis/pars defects. L5-S1 disc degeneration near complete loss of disc height and vacuum change. Mild thoracic spondylosis with small anterior osteophyte formation.  Intramedullary nail track in the proximal right femoral diaphysis is partially imaged. Heterotopic bone formation along the right greater trochanter noted.    IMPRESSION:    Long segment continuous left-sided colitis from the transverse colon to the rectum with pericolonic inflammatory changes, in keeping with history of ulcerative colitis. Air distended transverse colon. Correlate clinically for toxic megacolon.   08-Aug-2020

## 2020-09-23 NOTE — H&P ADULT - NSHPPHYSICALEXAM_GEN_ALL_CORE
VITALS:   T(C): 36.9 (09-23-20 @ 05:27), Max: 37.1 (09-22-20 @ 16:01)  HR: 77 (09-23-20 @ 05:27) (62 - 85)  BP: 117/70 (09-23-20 @ 05:27) (115/68 - 147/79)  RR: 18 (09-23-20 @ 05:27) (18 - 18)  SpO2: 99% (09-23-20 @ 05:27) (99% - 100%)    GENERAL: NAD, lying in bed comfortably  HEAD:  Atraumatic, Normocephalic  EYES: EOMI, PERRLA, conjunctiva and sclera clear  ENT: Moist mucous membranes  NECK: Supple, No JVD  CHEST/LUNG: Clear to auscultation bilaterally; No rales, rhonchi, wheezing, or rubs. Unlabored respirations  HEART: Regular rate and rhythm; No murmurs, rubs, or gallops  ABDOMEN: BSx4; Soft, nondistended, tender to palpation lower abdomen. no rebound or guarding   EXTREMITIES:  2+ Peripheral Pulses, brisk capillary refill. No clubbing, cyanosis, or edema  NERVOUS SYSTEM:  A&Ox3, no focal deficits   SKIN: No rashes or lesions  psych: flat affect

## 2020-09-23 NOTE — H&P ADULT - ASSESSMENT
50 year old male with pmhx MAGDA x 3 (2014), ulcerative colitis (on Xeljanz and prednisone) who is presenting with abdominal pain 2/2 UC

## 2020-09-23 NOTE — PROGRESS NOTE ADULT - SUBJECTIVE AND OBJECTIVE BOX
SURGERY DAILY PROGRESS NOTE:      S:   Patient seen and examined. No acute events overnight. Pain is well controlled. GI fxn +/+. Tolerating diet w/o N/V      O:   Exam:  Gen: NAD. A&Ox3.  Well developed, alert and cooperative.   Resp: No additional work of breathing.   Card: RR. No peripheral edema or pallor.   Abd: Soft, ND, NT. No rebound or guarding.   Ext: WWP. Able to move all extremities equally.    Vital Signs Last 24 Hrs  T(C): 36.9 (23 Sep 2020 05:27), Max: 37.1 (22 Sep 2020 16:01)  T(F): 98.5 (23 Sep 2020 05:27), Max: 98.8 (22 Sep 2020 16:01)  HR: 77 (23 Sep 2020 05:27) (62 - 85)  BP: 117/70 (23 Sep 2020 05:27) (115/68 - 147/79)  BP(mean): --  RR: 18 (23 Sep 2020 05:27) (18 - 18)  SpO2: 99% (23 Sep 2020 05:27) (99% - 100%)        LABS:                        11.0   10.26 )-----------( 452      ( 22 Sep 2020 19:22 )             35.8     09-22    132<L>  |  96  |  21  ----------------------------<  99  4.0   |  22  |  1.12    Ca    8.7      22 Sep 2020 23:25    TPro  6.2  /  Alb  3.2<L>  /  TBili  0.6  /  DBili  x   /  AST  11  /  ALT  19  /  AlkPhos  38<L>  09-22    PT/INR - ( 22 Sep 2020 19:22 )   PT: 16.0 sec;   INR: 1.36 ratio         PTT - ( 22 Sep 2020 19:22 )  PTT:30.0 sec

## 2020-09-23 NOTE — DISCHARGE NOTE NURSING/CASE MANAGEMENT/SOCIAL WORK - PATIENT PORTAL LINK FT
You can access the FollowMyHealth Patient Portal offered by [facility] by registering at the following website: [email or web address]. By joining [employer]’s FollowMyHealth portal, you will also be able to view your health information using other applications (apps) compatible with our system.

## 2020-09-23 NOTE — CONSULT NOTE ADULT - ASSESSMENT
Full note to follow  follow up cdiff, if negative would start IV methylprednisolone 20 mg TID    Differential diagnosis and plan of care discussed with patient after the evaluation  75 Minutes spent on total encounter of which more than fifty percent of the encounter was spent counseling and/or coordinating care by the attending physician.  Advanced care planning was discussed with the patient and/or surrogate decision makers. Advanced care planning forms were discussed. The risks benefits and alternatives to pertinent gastrointestinal procedures and interventions were discussed in detail and all questions were answered. Duration: 30 Minutes.      Brandon Gray M.D. (Covering for Dr. Cesar Meeks)  Gastroenterology and Hepatology  Cell: [phone] 50 M w UC on Xeljanz p/w severe UC

## 2020-09-23 NOTE — PROGRESS NOTE ADULT - ASSESSMENT
50 year old male with pmhx MAGDA x 3 (2014), ulcerative colitis (on Xeljanz and prednisone) who is presenting with abdominal pain 2/2 UC 50 year old male with hx of CAD, MI s/p MAGDA x 3 (March 2014), TIA (June 2014 w/o complications), ulcerative colitis (dx'ed 2010, on Xeljanz and prednisone) who is presenting with abdominal pain 2/2 UC 50 year old male with hx of CAD, MI s/p MAGDA x 3 (March 2014), TIA (June 2014 w/o complications), ulcerative colitis (dx'ed 2010, on Xeljanz and prednisone) who is presenting with abdominal pain 2/2 UC Flare 50 year old male with hx of CAD, MI s/p MAGDA x 3 (March 2014), TIA (June 2014 w/o complications), ulcerative colitis (dx'ed 2010, on Xeljanz and prednisone) who is presenting with abdominal pain 2/2 UC Flare.

## 2020-09-23 NOTE — H&P ADULT - PROBLEM SELECTOR PLAN 1
Dx UC 2010. Recently admitted to [place] for 6 weeks for management of UC symptoms. Colonoscopy showed inflammation throughout 2/3 of his colon. No surgical intervention at that time. Discharged on Xelijanz and prednisone 40mg qdaily. Now admitted for continued abdominal pain and diarrhea.  -s/p cipro and flagyl in the ED  -CT abdomen long segment continuous left-sided colitis from the transverse colon to the rectum with pericolonic inflammatory changes. Air distended transverse colon. Correlate clinically for toxic megacolon.  -seen by colorectal surgery, appreciate recs  -no urgent surgical intervention at this time  -would benefit from elective protocolectomy when inflammation has reduced  -GI consult   -pain control oxycodone 5mg q8hrs (outpatient regiment)  -c/w prednisone 40mg qdaily  -c/w Xeljanz Dx UC 2010. Recently admitted to [place] for management of UC symptoms. Colonoscopy showed inflammation throughout 2/3 of his colon. No surgical intervention at that time. Discharged on Xelijanz and prednisone 40mg qdaily. Now admitted for continued abdominal pain and diarrhea.  -s/p cipro and flagyl in the ED  -CT abdomen long segment continuous left-sided colitis from the transverse colon to the rectum with pericolonic inflammatory changes. Air distended transverse colon. Correlate clinically for toxic megacolon.  -seen by colorectal surgery, appreciate recs  -no urgent surgical intervention at this time  -would benefit from elective protocolectomy when inflammation has reduced  -GI consult   -pain control oxycodone 5mg q8hrs (outpatient regiment)  -c/w prednisone 40mg qdaily  -c/w Xeljanz  -c/w dicyclomine Dx UC 2010. Recently admitted to [place] for management of UC symptoms. Colonoscopy showed inflammation throughout 2/3 of his colon. No surgical intervention at that time. Discharged on Xelijanz and prednisone 40mg qdaily. Now admitted for continued abdominal pain and diarrhea.  -s/p cipro and flagyl in the ED  -CT abdomen long segment continuous left-sided colitis from the transverse colon to the rectum with pericolonic inflammatory changes. Air distended transverse colon. Correlate clinically for toxic megacolon.  -seen by colorectal surgery, appreciate recs  -no urgent surgical intervention at this time  -would benefit from elective protocolectomy when inflammation has reduced  -GI consult   -pain control with morphine   -c/w prednisone 40mg qdaily  -c/w dicyclomine  -c/w cipro/flagyl  -check c. diff   -IVF maintenance Dx UC 2010. Recently admitted to [place] for management of UC symptoms. Colonoscopy showed inflammation throughout 2/3 of his colon. No surgical intervention at that time. Discharged on Xelijanz and prednisone 40mg qdaily. Now admitted for continued abdominal pain and diarrhea.  -s/p cipro and flagyl in the ED  -CT abdomen long segment continuous left-sided colitis from the transverse colon to the rectum with pericolonic inflammatory changes. Air distended transverse colon. Correlate clinically for toxic megacolon.  -seen by colorectal surgery, appreciate recs  -no urgent surgical intervention at this time  -would strongly consider elective proctocolectomy when inflammation has reduced  -GI consult with Cesar Fry [phone]  -pain control with IV morphine   -c/w prednisone 40mg qdaily  -c/w dicyclomine  -c/w cipro/flagyl  -check c. diff   -IVF maintenance  -Continuation of home xeljanz as per GI, pt has meds with him. Dx UC 2010. Recently admitted to [place] for management of UC symptoms. Colonoscopy showed inflammation throughout 2/3 of his colon. No surgical intervention at that time. Discharged on Xelijanz and prednisone 40mg qdaily. Now admitted for continued abdominal pain and diarrhea. Failed multiple biologic agents.  -s/p cipro and flagyl in the ED  -CT abdomen long segment continuous left-sided colitis from the transverse colon to the rectum with pericolonic inflammatory changes. Air distended transverse colon. Correlate clinically for toxic megacolon.  -seen by colorectal surgery, appreciate recs  -no urgent surgical intervention at this time  -would strongly consider elective proctocolectomy when inflammation has reduced  -GI consult with Cesar Fry [phone]  -pain control with IV morphine   -c/w prednisone 40mg qdaily  -c/w dicyclomine  -c/w cipro/flagyl  -check c. diff   -IVF maintenance  -Continuation of home xeljanz as per GI, pt has meds with him.

## 2020-09-23 NOTE — H&P ADULT - ATTENDING COMMENTS
I have reviewed the labs, imaging and ekg. I have seen and examined the patient. I agree with above unless otherwise stated below.  50M w/ pmhx MAGDA x 3 (2014), ulcerative colitis (on Xeljanz and prednisone) p/w abdominal pain and diarrhea thought to be from worsening UC flare. Appreciate colo-rectal recommendations, strongly consider proctocolectomy.   -GI consult with Cesar Meeks [phone] in AM  -Will cont. IV cipro and flagyl for now  -Cont. IVF, f/u AM lactate  -Cont. prednisone  -Send C. diff  -Xeljanz continuation as per GI, pt has home meds with him  -Cont. asa  -DVT PPx, Lovenox I have reviewed the labs, imaging and ekg. I have seen and examined the patient. I agree with above unless otherwise stated below.  50M w/ pmhx MAGDA x 3 (2014), ulcerative colitis (on Xeljanz and prednisone) p/w abdominal pain and diarrhea thought to be from worsening UC flare. Appreciate colo-rectal recommendations, strongly consider proctocolectomy. Remicaide caused lupus like reaction. Had severe allergy to Entivio. Some benefit with 6-MP  -GI consult with Cesar Meeks [phone] in AM  -Will cont. IV cipro and flagyl for now  -Cont. IVF, f/u AM lactate  -Cont. prednisone  -Send C. diff  -Xeljanz continuation as per GI, pt has home meds with him  -Cont. asa  -DVT PPx, Lovenox

## 2020-09-23 NOTE — CONSULT NOTE ADULT - SUBJECTIVE AND OBJECTIVE BOX
Chief Complaint:  Patient is a 50y old  Male who presents with a chief complaint of abdominal pain (23 Sep 2020 07:57)      HPI: 51 yo M w/ PMHx UC, CAD (s/p MI w/ DESx3 in 2016) presenting w/ abd pain and rectal bleeding x 1 month    Patient diagnosed w/ UC in 2010. He follows w/ Dr. Cartwright. He has been on multiple biologics in past. Initially on infliximab, stopped due to lupus. Then started on entivio, which caused allergic reaction. Most recently he had some benefit w/ 6-MP and xeljanz, then stopped xeljanz several months ago because of insurance problems, restarted approximately 4 weeks ago. He states starting in july 2020 had a flare, consisting of frequent bloody bowel movements, minimal abdominal pain. Had 30 lbs weight loss during this time, decreased PO, + nausea but no vomiting. He last saw his GI during this time, flex sig was report of inflammation through "2/3 of colon." He then presented to [facility] 9/12 for worsening symptoms, was there for 5 days. Treated w/ steroids, colonoscopy done, and discharged. Since discharge has been taking xeljanz 10mg bid, prednisone 40mg daily with no plan to taper. He states over past week symptoms have worsened, now 10+ bloody bowel movements each day w/ associated pain, continues to have limited PO intake.   Denies fever/chills, no sick contacts, no receptive anal intercourse, no h/o STI. Denies joint pain, no vision pain, no  symptoms.     On presentation to ED, patient HDS. Labs notable for WBC 10, INR 1.3, albumin 3.2. CT A/P w/ wall thickening from rectum to transverse colon. Patient started on cipro/flagyl and admitted to medicine.     Allergies:  penicillin (Pruritus; Rash)      Home Medications:    Hospital Medications:  ciprofloxacin   IVPB 400 milliGRAM(s) IV Intermittent every 12 hours  enoxaparin Injectable 40 milliGRAM(s) SubCutaneous daily  influenza   Vaccine 0.5 milliLiter(s) IntraMuscular once  lactated ringers. 1000 milliLiter(s) IV Continuous <Continuous>  metroNIDAZOLE  IVPB 500 milliGRAM(s) IV Intermittent every 8 hours  morphine  - Injectable 4 milliGRAM(s) IV Push every 6 hours PRN  oxyCODONE    IR 5 milliGRAM(s) Oral every 6 hours PRN  predniSONE   Tablet 40 milliGRAM(s) Oral daily      PMHX/PSHX:  MI (myocardial infarction)    CAD (coronary artery disease)    Ulcerative colitis    S/P knee surgery        Family history:      Denies family history of colon cancer/polyps, stomach cancer/polyps, pancreatic cancer/masses, liver cancer/disease, ovarian cancer and endometrial cancer.    Social History:   Tob: Denies  EtOH: Denies  Illicit Drugs: Denies    ROS:     General: See HPI  Eyes:  Good vision, no reported pain  ENT:  No sore throat, pain, runny nose, dysphagia  CV:  No pain, palpitations, hypo/hypertension  Pulm:  No dyspnea, cough, tachypnea, wheezing  GI:  see HPI  :  No pain, bleeding, incontinence, nocturia  Muscle:  No pain, weakness  Neuro:  No weakness, tingling, memory problems  Psych:  No fatigue, insomnia, mood problems, depression  Endocrine:  No polyuria, polydipsia, cold/heat intolerance  Heme:  No petechiae, ecchymosis, easy bruisability  Skin:  No rash, tattoos, scars, edema    PHYSICAL EXAM:     GENERAL:  thin man, No acute distress  HEENT:  Normocephalic/atraumatic, no scleral icterus  CHEST:  Clear to auscultation bilaterally, no wheezes/rales/ronchi, no accessory muscle use  HEART:  Regular rate and rhythm, no murmurs/rubs/gallops  ABDOMEN:  thin, diffusely tender, +voluntary guarding  RECTAL: external skin tags, no fluctuance, +tenderness on digital rectal exam, no stool noted  EXTREMITIES: No cyanosis, clubbing, or edema  SKIN:  No rash/erythema/ecchymoses/petechiae/wounds/abscess/warm/dry  NEURO:  Alert and oriented x 3, no asterixis    Vital Signs:  Vital Signs Last 24 Hrs  T(C): 36.9 (23 Sep 2020 05:27), Max: 37.1 (22 Sep 2020 16:01)  T(F): 98.5 (23 Sep 2020 05:27), Max: 98.8 (22 Sep 2020 16:01)  HR: 77 (23 Sep 2020 05:27) (62 - 85)  BP: 117/70 (23 Sep 2020 05:27) (115/68 - 147/79)  BP(mean): --  RR: 18 (23 Sep 2020 05:27) (18 - 18)  SpO2: 99% (23 Sep 2020 05:27) (99% - 100%)  Daily Height in cm: 182.88 (23 Sep 2020 02:09)    Daily     LABS:                        11.0   10.26 )-----------( 452      ( 22 Sep 2020 19:22 )             35.8     Mean Cell Volume: 87.1 fl (09-22-20 @ 19:22)    09-22    132<L>  |  96  |  21  ----------------------------<  99  4.0   |  22  |  1.12    Ca    8.7      22 Sep 2020 23:25    TPro  6.2  /  Alb  3.2<L>  /  TBili  0.6  /  DBili  x   /  AST  11  /  ALT  19  /  AlkPhos  38<L>  09-22    LIVER FUNCTIONS - ( 22 Sep 2020 19:22 )  Alb: 3.2 g/dL / Pro: 6.2 g/dL / ALK PHOS: 38 U/L / ALT: 19 U/L / AST: 11 U/L / GGT: x           PT/INR - ( 22 Sep 2020 19:22 )   PT: 16.0 sec;   INR: 1.36 ratio         PTT - ( 22 Sep 2020 19:22 )  PTT:30.0 sec    Amylase Serum--      Lipase serum14       Ammonia--                          11.0   10.26 )-----------( 452      ( 22 Sep 2020 19:22 )             35.8       Imaging:  CT A/P 9/22  FINDINGS:    LOWER CHEST: Right lower lobe calcified granuloma. No pleural effusion.  LIVER: Within normal limits.  BILE DUCTS: Normal caliber.  GALLBLADDER: Within normal limits.  SPLEEN: Within normal limits.  PANCREAS: Within normal limits.  ADRENALS: Within normal limits.    KIDNEYS/URETERS: Within normal limits.  BLADDER: Within normal limits.  REPRODUCTIVE ORGANS: Prostate within normal limits.    BOWEL: Long segment continuous left-sided colonic wall thickening extending from the transverse colon to the rectum with pericolonic inflammatory changes, compatible with colitis. The transverse colon is distended with air up to 6 cm.  No bowel obstruction. Appendix is normal. No free air or abscess.    VESSELS: Atherosclerotic changes.  RETROPERITONEUM/LYMPH NODES: No lymphadenopathy.  ABDOMINAL WALL: Tiny fat-containing umbilical hernia.  BONES: Grade 2 anterolisthesis of L5 on S1 due to bilateral spondylolysis/pars defects. L5-S1 disc degeneration near complete loss of disc height and vacuum change. Mild thoracic spondylosis with small anterior osteophyte formation.  Intramedullary nail track in the proximal right femoral diaphysis is partially imaged. Heterotopic bone formation along the right greater trochanter noted.    IMPRESSION:    Long segment continuous left-sided colitis from the transverse colon to the rectum with pericolonic inflammatory changes, in keeping with history of ulcerative colitis. Air distended transverse colon. Correlate clinically for toxic megacolon.              
Colorectal Surgery Consult  Consulting surgical team: Colorectal Surgery (Red Team)  Consulting attending: Juice Dennison    HPI: 50M hx MI s/p MAGDA x3 (2014), ulcerative colitis (on Xeljanz), with lower abdominal pain x 6 weeks. Patient was diagnosed with ulcerative colitis in 2010. He was started on Remicade in 2016, however was stopped due to lupus and arthritis. He was switched to Entyvio but also did not tolerate it, and is now on Xeljanz. At the beginning of August, he reports having increasing pain with defecation, up to 20 bowel movements daily, typically loose and bloody. He spoke to his GI (Dr. Fry), who advised him to present to the hospital. He went to [place] because his cardiologist is based there, and was admitted for further work up. He had a colonoscopy performed 1 week ago which showed inflammation throughout 2/3 of his colon and was then started on prednisone 40 mg daily. Denies receiving antibiotics. He was discharged home however continued to have pain and now presents to the ED for further evaluation. Of note, patient reports 30 lb weight loss over the past month due to poor appetite and debilitating diarrhea. Denies fevers, chills.       PAST MEDICAL HISTORY:  Ulcerative colitis  Myocardial infarction    PAST SURGICAL HISTORY:  S/P knee surgery    MEDICATIONS:  ciprofloxacin   IVPB 400 milliGRAM(s) IV Intermittent once      ALLERGIES:  penicillin (Pruritus; Rash)      VITALS & I/Os:  Vital Signs Last 24 Hrs  T(C): 37.1 (22 Sep 2020 16:01), Max: 37.1 (22 Sep 2020 16:01)  T(F): 98.8 (22 Sep 2020 16:01), Max: 98.8 (22 Sep 2020 16:01)  HR: 68 (22 Sep 2020 20:06) (62 - 85)  BP: 146/79 (22 Sep 2020 23:09) (120/75 - 146/79)  BP(mean): --  RR: 18 (22 Sep 2020 20:06) (18 - 18)  SpO2: 100% (22 Sep 2020 20:06) (99% - 100%)    I&O's Summary      PHYSICAL EXAM:  General: No acute distress  Respiratory: Nonlabored  Cardiovascular: RRR  Abdominal: Soft, nondistended, tender in lower abdomen with involuntary guarding, no rebound.   Extremities: Warm    LABS:                        11.0   10.26 )-----------( 452      ( 22 Sep 2020 19:22 )             35.8     09-22    135  |  95<L>  |  21  ----------------------------<  117<H>  5.8<H>   |  28  |  1.20    Ca    9.4      22 Sep 2020 19:22    TPro  6.2  /  Alb  3.2<L>  /  TBili  0.6  /  DBili  x   /  AST  11  /  ALT  19  /  AlkPhos  38<L>  09-22    Lactate:  09-22 @ 19:22  2.6    PT/INR - ( 22 Sep 2020 19:22 )   PT: 16.0 sec;   INR: 1.36 ratio         PTT - ( 22 Sep 2020 19:22 )  PTT:30.0 sec        IMAGING:  < from: CT Abdomen and Pelvis w/ IV Cont (09.22.20 @ 20:32) >  EXAM:  CT ABDOMEN AND PELVIS IC                            PROCEDURE DATE:  09/22/2020            INTERPRETATION:  CLINICAL INFORMATION: History of ulcerative colitis. Severe lower abdominal pain.    COMPARISON: CT abdomen/pelvis 1/10/2011.    PROCEDURE: CT of the Abdomen and Pelvis was performed with intravenous contrast.  Intravenous contrast: 90 ml Omnipaque 350. 10 ml discarded.  Oral contrast: None.  Sagittal and coronal reformats were performed.    FINDINGS:    LOWER CHEST: Right lower lobe calcified granuloma. No pleural effusion.  LIVER: Within normal limits.  BILE DUCTS: Normal caliber.  GALLBLADDER: Within normal limits.  SPLEEN: Within normal limits.  PANCREAS: Within normal limits.  ADRENALS: Within normal limits.    KIDNEYS/URETERS: Within normal limits.  BLADDER: Within normal limits.  REPRODUCTIVE ORGANS: Prostate within normal limits.    BOWEL: Long segment continuous left-sided colonic wall thickening extending from the transverse colon to the rectum with pericolonic inflammatory changes, compatible with colitis. The transverse colon is distended with air up to 6 cm.  No bowel obstruction. Appendix is normal. No free air or abscess.    VESSELS: Atherosclerotic changes.  RETROPERITONEUM/LYMPH NODES: No lymphadenopathy.  ABDOMINAL WALL: Tiny fat-containing umbilical hernia.  BONES: Grade 2 anterolisthesis of L5 on S1 due to bilateral spondylolysis/pars defects. L5-S1 disc degeneration near complete loss of disc height and vacuum change. Mild thoracic spondylosis with small anterior osteophyte formation.  Intramedullary nail track in the proximal right femoral diaphysis is partially imaged. Heterotopic bone formation along the right greater trochanter noted.    IMPRESSION:    Long segment continuous left-sided colitis from the transverse colon to the rectum with pericolonic inflammatory changes, in keeping with history of ulcerative colitis. Air distended transverse colon. Correlate clinically for toxic megacolon.      MURTAZA GUZMAN M.D., RADIOLOGY RESIDENT  This document has been electronically signed.  LISA HENDERSON M.D., ATTENDING RADIOLOGIST  This document has been electronically signed. Sep 22 2020 10:35PM    < end of copied text >  
Chief Complaint:  Patient is a 50y old  Male who presents with a chief complaint of abdominal pain (23 Sep 2020 12:05)      HPI:  51 yo M w/ PMHx UC, CAD (s/p MI w/ DESx3 in 2016) presenting w/ abd pain and rectal bleeding x 1 month    Patient diagnosed w/ UC in 2010. He follows w/ Dr. Meeks.  Initially on infliximab, stopped due to lupus. Then started on entivio, which caused allergic reaction. Most recently he had some benefit w/ 6-MP and xeljanz, then stopped xeljanz several months ago because of insurance problems, restarted approximately 4 weeks ago. He states starting in july 2020 had a flare, consisting of frequent bloody bowel movements. Had 30 lbs weight loss during this time, decreased PO, + nausea but no vomiting. He last saw his GI during this time, flex sig was report of inflammation through "2/3 of colon." He then presented to [facility] 9/12 for worsening symptoms, was there for 5 days. Treated w/IV steroids, colonoscopy done, and the same day following 3 days total IV steroids. Since discharge has been taking xeljanz 10mg bid, prednisone 40mg. He states over past week symptoms have worsened, now 10+ bloody bowel movements each day w/ associated pain, continues to have limited PO intake.     On presentation to ED, patient HDS. Labs notable for WBC 10, INR 1.3, albumin 3.2. CT A/P w/ colonic wall thickening from rectum to transverse colon. Patient started on cipro/flagyl and admitted to medicine.             Allergies:  Entyvio (Swelling)  penicillin (Pruritus; Rash)  Remicade (Other)      Home Medications:    Hospital Medications:  aspirin enteric coated 81 milliGRAM(s) Oral daily  dicyclomine 20 milliGRAM(s) Oral four times a day before meals  heparin   Injectable 5000 Unit(s) SubCutaneous every 12 hours  influenza   Vaccine 0.5 milliLiter(s) IntraMuscular once  lactated ringers. 1000 milliLiter(s) IV Continuous <Continuous>  melatonin 3 milliGRAM(s) Oral at bedtime  methylPREDNISolone sodium succinate Injectable 20 milliGRAM(s) IV Push three times a day  morphine  - Injectable 4 milliGRAM(s) IV Push every 6 hours PRN  oxyCODONE    IR 5 milliGRAM(s) Oral every 6 hours PRN  tofacitinib Tablet 10 milliGRAM(s) Oral two times a day      PMHX/PSHX:  MI (myocardial infarction)    CAD (coronary artery disease)    Ulcerative colitis    S/P knee surgery        Family history:      Social History:   Denies ethanol use.  Denies illicit drug use.    ROS:     General:  No wt loss, fevers, chills, night sweats, fatigue,   Eyes:  Good vision, no reported pain  ENT:  No sore throat, pain, runny nose, dysphagia  CV:  No pain, palpitations, hypo/hypertension  Resp:  No dyspnea, cough, tachypnea, wheezing  GI:  See HPI  :  No pain, bleeding, incontinence, nocturia  Muscle:  No pain, weakness  Neuro:  No weakness, tingling, memory problems  Psych:  No fatigue, insomnia, mood problems, depression  Endocrine:  No polyuria, polydipsia, cold/heat intolerance  Heme:  No petechiae, ecchymosis, easy bruisability  Integumentary:  No rash, edema      PHYSICAL EXAM:     GENERAL:  Appears stated age, well-groomed, well-nourished, no distress  HEENT:  NC/AT,  conjunctivae anicteric, clear and pink,   NECK: supple, trachea midline  CHEST:  Full & symmetric excursion, no increased effort, breath sounds clear  HEART:  Regular rhythm, no JVD  ABDOMEN:  Soft, moderatly-tender, non-distended, normoactive bowel sounds,  no masses , no hepatosplenomegaly  EXTREMITIES:  no cyanosis,clubbing or edema  SKIN:  No rash, erythema, or, ecchymoses, no jaundice  NEURO:  Alert, non-focal, no asterixis  PSYCH: Appropriate affect, oriented to place and time  RECTAL: Deferred      Vital Signs:  Vital Signs Last 24 Hrs  T(C): 37 (23 Sep 2020 20:31), Max: 37 (23 Sep 2020 20:31)  T(F): 98.6 (23 Sep 2020 20:31), Max: 98.6 (23 Sep 2020 20:31)  HR: 57 (23 Sep 2020 20:31) (57 - 77)  BP: 104/63 (23 Sep 2020 20:31) (104/63 - 147/79)  BP(mean): --  RR: 18 (23 Sep 2020 20:31) (18 - 18)  SpO2: 96% (23 Sep 2020 20:31) (96% - 100%)  Daily Height in cm: 182.88 (23 Sep 2020 02:09)    Daily     LABS:                        9.3    7.27  )-----------( 352      ( 23 Sep 2020 09:07 )             30.5     09-23    131<L>  |  98  |  15  ----------------------------<  96  3.9   |  27  |  1.06    Ca    8.1<L>      23 Sep 2020 09:07  Phos  2.0     09-23  Mg     1.9     09-23    TPro  5.0<L>  /  Alb  2.6<L>  /  TBili  0.6  /  DBili  x   /  AST  8<L>  /  ALT  14  /  AlkPhos  30<L>  09-23    LIVER FUNCTIONS - ( 23 Sep 2020 09:07 )  Alb: 2.6 g/dL / Pro: 5.0 g/dL / ALK PHOS: 30 U/L / ALT: 14 U/L / AST: 8 U/L / GGT: x           PT/INR - ( 23 Sep 2020 09:07 )   PT: 14.4 sec;   INR: 1.22 ratio         PTT - ( 23 Sep 2020 09:07 )  PTT:20.6 sec

## 2020-09-23 NOTE — PROGRESS NOTE ADULT - ASSESSMENT
50M hx MI s/p MAGDA x3 (2014), ulcerative colitis (on Xeljanz), with lower abdominal pain x 6 weeks presenting with likely UC flare    - No urgent surgical intervention indicated at this time  - Patient remains symptomatic despite treatment with 3 different biologic agents and steroids  - Recommend GI consultation and medical optimization  - Should patient remain refractory/unresponsive to medical therapy, patient would benefit from proctocolectomy   - Further recommendations and possible surgery pending clinical course, ideally operative intervention would be performed electively, without active inflammation    Randi Low, PGY2  Red Team Surgery

## 2020-09-23 NOTE — H&P ADULT - NSHPLABSRESULTS_GEN_ALL_CORE
11.0   10.26 )-----------( 452      ( 22 Sep 2020 19:22 )             35.8       09-22    132<L>  |  96  |  21  ----------------------------<  99  4.0   |  22  |  1.12    Ca    8.7      22 Sep 2020 23:25    TPro  6.2  /  Alb  3.2<L>  /  TBili  0.6  /  DBili  x   /  AST  11  /  ALT  19  /  AlkPhos  38<L>  09-22                  PT/INR - ( 22 Sep 2020 19:22 )   PT: 16.0 sec;   INR: 1.36 ratio         PTT - ( 22 Sep 2020 19:22 )  PTT:30.0 sec    Lactate Trend            CAPILLARY BLOOD GLUCOSE      POCT Blood Glucose.: 92 mg/dL (22 Sep 2020 19:11)        IMPRESSION:    Long segment continuous left-sided colitis from the transverse colon to the rectum with pericolonic inflammatory changes, in keeping with history of ulcerative colitis. Air distended transverse colon. Correlate clinically for toxic megacolon.    INTERPRETATION:  Clear lungs. 11.0   10.26 )-----------( 452      ( 22 Sep 2020 19:22 )             35.8     09-22    132<L>  |  96  |  21  ----------------------------<  99  4.0   |  22  |  1.12    Ca    8.7      22 Sep 2020 23:25    TPro  6.2  /  Alb  3.2<L>  /  TBili  0.6  /  DBili  x   /  AST  11  /  ALT  19  /  AlkPhos  38<L>  09-22        PT/INR - ( 22 Sep 2020 19:22 )   PT: 16.0 sec;   INR: 1.36 ratio       PTT - ( 22 Sep 2020 19:22 )  PTT:30.0 sec    Lactate Trend  CAPILLARY BLOOD GLUCOSE    POCT Blood Glucose.: 92 mg/dL (22 Sep 2020 19:11)      IMPRESSION:  Long segment continuous left-sided colitis from the transverse colon to the rectum with pericolonic inflammatory changes, in keeping with history of ulcerative colitis. Air distended transverse colon. Correlate clinically for toxic megacolon.    INTERPRETATION:  Clear lungs.    I have reviewed the labs and imaging and ekg.

## 2020-09-23 NOTE — H&P ADULT - HISTORY OF PRESENT ILLNESS
50 year old male with pmhx MAGDA x 3 (2014), ulcerative colitis (on Xeljanz and prednisone) who is presenting with abdominal pain. The patient was recently admitted at [place] for worsening UC symptoms including severe abdominal pain with ~ 20 BM per day with hematochezia. The patient had a colonoscopy at [place] showing inflammation throughout 2/3 of his colon. He was discharged from [place] 1 week ago on Xeljanz and 40mg prednisone qdaily. Since being discharged he reports continued abdominal pain and diarrhea which lead him to come to [facility]. He notes a ~30lb weight loss over the last month. Denies any fevers, chills, cough, nausea or vomiting. 50 year old male with pmhx MAGDA x 3 (2014), ulcerative colitis (on Xeljanz and prednisone) who is presenting with abdominal pain. The patient was recently admitted at [place] for worsening UC symptoms including severe abdominal pain with ~ 20 BM per day with hematochezia. The patient had a colonoscopy at [place] showing inflammation throughout 2/3 of his colon. He was discharged from [place] 1 week ago on Xeljanz and 40mg prednisone qdaily. Since being discharged he reports continued abdominal pain and diarrhea which lead him to come to [facility]. He notes a ~30lb weight loss over the last month. Denies any fevers, chills, cough, nausea or vomiting. Has tried remicaide, 6-MP and Entivio in the past. Remicaide caused lupus while Entivio caused allergic reaction. Some benefit with 6-MP and Xeljanz in the past.

## 2020-09-23 NOTE — PROGRESS NOTE ADULT - PROBLEM SELECTOR PLAN 3
lovenox  DASH/TLC diet DVT ppx: lovenox  Diet: liquid diet  Dispo: pending clinical improvement DVT ppx: HSQ  Diet: NPo pending GI recs for any procedures  Dispo: pending clinical improvement DVT ppx: HSQ  Diet: liquid diet  Dispo: pending clinical improvement

## 2020-09-23 NOTE — PROGRESS NOTE ADULT - PROBLEM SELECTOR PLAN 1
Dx UC 2010. Recently admitted to [place] for management of UC symptoms. Colonoscopy showed inflammation throughout 2/3 of his colon. No surgical intervention at that time. Discharged on Xelijanz and prednisone 40mg qdaily. Now admitted for continued abdominal pain and diarrhea. Failed multiple biologic agents.  -s/p cipro and flagyl in the ED  -CT abdomen long segment continuous left-sided colitis from the transverse colon to the rectum with pericolonic inflammatory changes. Air distended transverse colon. Correlate clinically for toxic megacolon.  -seen by colorectal surgery, appreciate recs  -no urgent surgical intervention at this time  -would strongly consider elective proctocolectomy when inflammation has reduced  -GI consult with Cesar Fry [phone]  -pain control with IV morphine   -c/w prednisone 40mg qdaily  -c/w dicyclomine  -c/w cipro/flagyl  -check c. diff   -IVF maintenance  -Continuation of home xeljanz as per GI, pt has meds with him. Dx UC 2010. Recently admitted to [place] for management of UC symptoms. Colonoscopy showed inflammation throughout 2/3 of his colon. Discharged on Xelijanz and prednisone 40mg qdaily. Now admitted for continued abdominal pain and diarrhea. Failed multiple biologic agents, though reports 6-MP & Xeljanz more helpful.    LABS  - check c. diff     RAD  -CT abdomen long segment continuous left-sided colitis from the transverse colon to the rectum with pericolonic inflammatory changes. Air distended transverse colon. Correlate clinically for toxic megacolon.    CONSULTS  - seen by colorectal surgery, appreciate recs - no urgent surgical intervention for now, would strongly consider elective proctocolectomy when inflammation has reduced  - GI consult with Cesar Fry [phone], pending recs    MEDS  - pain control with IV morphine 4mg q6hrs PRN for severe pain, Oxycodone 10mg PO PRN for moderate pain  - c/w prednisone 40mg qdaily  - c/w dicyclomine  - s/p cipro/flgyl in ED, continue  - Continuation of home Xeljanz as per GI, pt has meds with him    IVF maintenance Dx UC 2010. Recently admitted to [place] for management of UC symptoms. Colonoscopy showed inflammation throughout 2/3 of his colon. Discharged on Xelijanz and prednisone 40mg qdaily. Now admitted for continued abdominal pain and diarrhea. Failed multiple biologic agents, though reports 6-MP & Xeljanz more helpful.    LABS  - check c. diff, GI PCR    RAD  -CT abdomen long segment continuous left-sided colitis from the transverse colon to the rectum with pericolonic inflammatory changes. Air distended transverse colon. Correlate clinically for toxic megacolon.    CONSULTS  - seen by colorectal surgery, appreciate recs - no urgent surgical intervention for now, would strongly consider elective proctocolectomy when inflammation has reduced  - GI consult with Cesar Fry [phone], pending recs    MEDS  - pain control with IV morphine 4mg q6hrs PRN for severe pain, Oxycodone 5 mg q6h PRN for moderate pain  - c/w prednisone 40mg qdaily, d/w GI re IV steroids  - c/w dicyclomine 20 mg 4/d for abd pain, antispasmodic  - s/p cipro/flgyl in ED, continue, f/u with GI  - D/w GI regarding continuation of home Xeljanz as per GI, pt has meds with him    IVF maintenance with  mL/h Dx UC 2010. Recently admitted to [place] for management of UC symptoms. Colonoscopy showed inflammation throughout 2/3 of his colon. Discharged on Xelijanz and prednisone 40mg qdaily. Now admitted for continued abdominal pain and diarrhea. Failed multiple biologic agents, though reports 6-MP & Xeljanz more helpful. Per Dr. Gray (covering GI for Dr. Cartwright)  MEDS: continue home Xaljans 10mg BID, dicyclomine 20mg QID; start IV methylprenisolone 20mg IV push TID  LABS  - check c. diff, GI PCR    RAD  -CT abdomen long segment continuous left-sided colitis from the transverse colon to the rectum with pericolonic inflammatory changes. Air distended transverse colon. Correlate clinically for toxic megacolon.    CONSULTS  - seen by colorectal surgery, appreciate recs - no urgent surgical intervention for now, would strongly consider elective proctocolectomy when inflammation has reduced  - GI consult with Cesar Fry [phone], pending recs    MEDS  - pain control with IV morphine 4mg q6hrs PRN for severe pain, Oxycodone 5 mg q6h PRN for moderate pain  - c/w prednisone 40mg qdaily, d/w GI re IV steroids  - c/w dicyclomine 20 mg 4/d for abd pain, antispasmodic  - s/p cipro/flgyl in ED, continue, f/u with GI  - D/w GI regarding continuation of home Xeljanz as per GI, pt has meds with him    IVF maintenance with  mL/h Active UC flare despite Xaljanz & prenisone regimen. Negative for C. diff, afebrile, no leukocytosis, and pt's hx largely not suspicious for infectious causes, though GI panel pending. Per Dr. Gray (covering GI for Dr. Cartwright), hold off on cyclosporine for now; no need for flex sig w/ recent colonoscopy at [place]. Mildly anemic, positive orthostatics. No need for urgent surgical intervention for now per colorectal surgery. Will monitor for worsening symptoms concerning for toxic megacolon.  MEDS: continue home Xaljans 10mg BID, dicyclomine 20mg QID; start IV methylprenisolone 20mg IV push TID; c/w morphine 4mg q6h PRN & oxycodone 5mg q6h PRN for pain  STUDIES: GI PCR pending. Daily CBC, CMP, mg, phos, coags.  CONSULTS: GI Dr. Cartwright/Dr. Gray & colorectal surg following, appreciate recs   - Maintenance IV  mL/h continuous

## 2020-09-23 NOTE — CONSULT NOTE ADULT - ASSESSMENT
Impression:  51 yo M w/ PMHx UC (currently on xeljanx and prednisone 40, prior treatment w/ remicade (lupus) and entivio (allergic)), CAD s/p MI w/ MAGDA (2016) presenting w/ progressive abd pain and hematochezia    # hematochezia - concerning for UC flair. Precipitant unknown, possibly related to period in 8/2020 when off all biologics/small molecules due to insurance. Since then has had worsening symptoms. Will need to obtain collateral from private GI regarding treatment history, as well as records from [place] regarding recent colonoscopy to assess extent of disease. Concerning features include dilated transverse colon to 6cm. Need to rule out infection.     # CAD - unclear risk factors of CAD w/ MI, as patient is young and otherwise healthy. May relate to ongoing inflammation from IBD    Recommendations:  - obtain stool studies to rule out infection, including stool GI PCR and C diff  - check ESR/CRP/fecal calprotectin to assess degree of ongoing inflammation  - c/w home prednisone 40mg daily for now  - obtain collateral from outpatient GI regarding treatment history  - obtain records from [place] regarding colonoscopy  - can continue w/ cipro/flagyl for now  - NPO for now  - pending above workup, will discuss role of flex sig (to rule out CMV) and possible initiation of IV steroids vs rescue medication w/ cyclosporine (previously failed infliximab so likely will not retrial)  - recommend early surgery consult given transverse colon dilation   - VTE ppx as patient's w/ IBD are high risk for VTE    Thank you for this interesting consult. GI will continue to follow.     Zach Wells, PGY4  Gastroenterology Fellow  Available on [employer] Teams  [phone] ([employer] Short Range Pager)  [phone] (Long Range Pager)    After 5pm, please contact the on-call GI fellow. [phone]

## 2020-09-24 LAB
ALBUMIN SERPL ELPH-MCNC: 2.2 G/DL — LOW (ref 3.3–5)
ALP SERPL-CCNC: 31 U/L — LOW (ref 40–120)
ALT FLD-CCNC: 12 U/L — SIGNIFICANT CHANGE UP (ref 10–45)
ANION GAP SERPL CALC-SCNC: 10 MMOL/L — SIGNIFICANT CHANGE UP (ref 5–17)
APTT BLD: 29 SEC — SIGNIFICANT CHANGE UP (ref 27.5–35.5)
AST SERPL-CCNC: 5 U/L — LOW (ref 10–40)
BASOPHILS # BLD AUTO: 0 K/UL — SIGNIFICANT CHANGE UP (ref 0–0.2)
BASOPHILS NFR BLD AUTO: 0 % — SIGNIFICANT CHANGE UP (ref 0–2)
BILIRUB SERPL-MCNC: 0.3 MG/DL — SIGNIFICANT CHANGE UP (ref 0.2–1.2)
BUN SERPL-MCNC: 14 MG/DL — SIGNIFICANT CHANGE UP (ref 7–23)
CALCIUM SERPL-MCNC: 8.3 MG/DL — LOW (ref 8.4–10.5)
CHLORIDE SERPL-SCNC: 98 MMOL/L — SIGNIFICANT CHANGE UP (ref 96–108)
CO2 SERPL-SCNC: 25 MMOL/L — SIGNIFICANT CHANGE UP (ref 22–31)
CREAT SERPL-MCNC: 1.1 MG/DL — SIGNIFICANT CHANGE UP (ref 0.5–1.3)
EOSINOPHIL # BLD AUTO: 0 K/UL — SIGNIFICANT CHANGE UP (ref 0–0.5)
EOSINOPHIL NFR BLD AUTO: 0 % — SIGNIFICANT CHANGE UP (ref 0–6)
GLUCOSE SERPL-MCNC: 127 MG/DL — HIGH (ref 70–99)
HCT VFR BLD CALC: 27.5 % — LOW (ref 39–50)
HGB BLD-MCNC: 8.4 G/DL — LOW (ref 13–17)
INR BLD: 1.35 RATIO — HIGH (ref 0.88–1.16)
LYMPHOCYTES # BLD AUTO: 0.19 K/UL — LOW (ref 1–3.3)
LYMPHOCYTES # BLD AUTO: 2.6 % — LOW (ref 13–44)
MAGNESIUM SERPL-MCNC: 2 MG/DL — SIGNIFICANT CHANGE UP (ref 1.6–2.6)
MANUAL SMEAR VERIFICATION: SIGNIFICANT CHANGE UP
MCHC RBC-ENTMCNC: 26.5 PG — LOW (ref 27–34)
MCHC RBC-ENTMCNC: 30.5 GM/DL — LOW (ref 32–36)
MCV RBC AUTO: 86.8 FL — SIGNIFICANT CHANGE UP (ref 80–100)
MONOCYTES # BLD AUTO: 0.19 K/UL — SIGNIFICANT CHANGE UP (ref 0–0.9)
MONOCYTES NFR BLD AUTO: 2.6 % — SIGNIFICANT CHANGE UP (ref 2–14)
NEUTROPHILS # BLD AUTO: 7 K/UL — SIGNIFICANT CHANGE UP (ref 1.8–7.4)
NEUTROPHILS NFR BLD AUTO: 88.7 % — HIGH (ref 43–77)
NEUTS BAND # BLD: 6.1 % — SIGNIFICANT CHANGE UP (ref 0–8)
PHOSPHATE SERPL-MCNC: 3.5 MG/DL — SIGNIFICANT CHANGE UP (ref 2.5–4.5)
PLAT MORPH BLD: NORMAL — SIGNIFICANT CHANGE UP
PLATELET # BLD AUTO: 314 K/UL — SIGNIFICANT CHANGE UP (ref 150–400)
POTASSIUM SERPL-MCNC: 4.2 MMOL/L — SIGNIFICANT CHANGE UP (ref 3.5–5.3)
POTASSIUM SERPL-SCNC: 4.2 MMOL/L — SIGNIFICANT CHANGE UP (ref 3.5–5.3)
PROT SERPL-MCNC: 4.8 G/DL — LOW (ref 6–8.3)
PROTHROM AB SERPL-ACNC: 15.7 SEC — HIGH (ref 10.6–13.6)
RBC # BLD: 3.17 M/UL — LOW (ref 4.2–5.8)
RBC # FLD: 17.3 % — HIGH (ref 10.3–14.5)
RBC BLD AUTO: SIGNIFICANT CHANGE UP
SODIUM SERPL-SCNC: 133 MMOL/L — LOW (ref 135–145)
WBC # BLD: 7.38 K/UL — SIGNIFICANT CHANGE UP (ref 3.8–10.5)
WBC # FLD AUTO: 7.38 K/UL — SIGNIFICANT CHANGE UP (ref 3.8–10.5)

## 2020-09-24 PROCEDURE — 99221 1ST HOSP IP/OBS SF/LOW 40: CPT

## 2020-09-24 PROCEDURE — 99232 SBSQ HOSP IP/OBS MODERATE 35: CPT | Mod: GC

## 2020-09-24 RX ORDER — OXYCODONE HYDROCHLORIDE 5 MG/1
5 TABLET ORAL EVERY 4 HOURS
Refills: 0 | Status: DISCONTINUED | OUTPATIENT
Start: 2020-09-24 | End: 2020-09-30

## 2020-09-24 RX ORDER — MORPHINE SULFATE 50 MG/1
4 CAPSULE, EXTENDED RELEASE ORAL EVERY 4 HOURS
Refills: 0 | Status: DISCONTINUED | OUTPATIENT
Start: 2020-09-24 | End: 2020-09-30

## 2020-09-24 RX ORDER — OXYCODONE HYDROCHLORIDE 5 MG/1
5 TABLET ORAL ONCE
Refills: 0 | Status: DISCONTINUED | OUTPATIENT
Start: 2020-09-24 | End: 2020-09-24

## 2020-09-24 RX ORDER — POLYETHYLENE GLYCOL 3350 17 G/17G
17 POWDER, FOR SOLUTION ORAL ONCE
Refills: 0 | Status: COMPLETED | OUTPATIENT
Start: 2020-09-24 | End: 2020-09-24

## 2020-09-24 RX ORDER — POLYETHYLENE GLYCOL 3350 17 G/17G
17 POWDER, FOR SOLUTION ORAL DAILY
Refills: 0 | Status: DISCONTINUED | OUTPATIENT
Start: 2020-09-24 | End: 2020-09-26

## 2020-09-24 RX ADMIN — Medication 20 MILLIGRAM(S): at 13:27

## 2020-09-24 RX ADMIN — TOFACITINIB 10 MILLIGRAM(S): 11 TABLET, FILM COATED, EXTENDED RELEASE ORAL at 18:07

## 2020-09-24 RX ADMIN — MORPHINE SULFATE 4 MILLIGRAM(S): 50 CAPSULE, EXTENDED RELEASE ORAL at 14:47

## 2020-09-24 RX ADMIN — OXYCODONE HYDROCHLORIDE 5 MILLIGRAM(S): 5 TABLET ORAL at 08:54

## 2020-09-24 RX ADMIN — Medication 20 MILLIGRAM(S): at 05:15

## 2020-09-24 RX ADMIN — OXYCODONE HYDROCHLORIDE 5 MILLIGRAM(S): 5 TABLET ORAL at 18:34

## 2020-09-24 RX ADMIN — TOFACITINIB 10 MILLIGRAM(S): 11 TABLET, FILM COATED, EXTENDED RELEASE ORAL at 05:16

## 2020-09-24 RX ADMIN — MORPHINE SULFATE 4 MILLIGRAM(S): 50 CAPSULE, EXTENDED RELEASE ORAL at 14:17

## 2020-09-24 RX ADMIN — Medication 20 MILLIGRAM(S): at 21:28

## 2020-09-24 RX ADMIN — MORPHINE SULFATE 4 MILLIGRAM(S): 50 CAPSULE, EXTENDED RELEASE ORAL at 03:32

## 2020-09-24 RX ADMIN — MORPHINE SULFATE 4 MILLIGRAM(S): 50 CAPSULE, EXTENDED RELEASE ORAL at 04:10

## 2020-09-24 RX ADMIN — OXYCODONE HYDROCHLORIDE 5 MILLIGRAM(S): 5 TABLET ORAL at 01:29

## 2020-09-24 RX ADMIN — POLYETHYLENE GLYCOL 3350 17 GRAM(S): 17 POWDER, FOR SOLUTION ORAL at 13:27

## 2020-09-24 RX ADMIN — Medication 3 MILLIGRAM(S): at 21:29

## 2020-09-24 RX ADMIN — Medication 20 MILLIGRAM(S): at 21:29

## 2020-09-24 RX ADMIN — OXYCODONE HYDROCHLORIDE 5 MILLIGRAM(S): 5 TABLET ORAL at 09:24

## 2020-09-24 RX ADMIN — Medication 20 MILLIGRAM(S): at 18:05

## 2020-09-24 RX ADMIN — Medication 81 MILLIGRAM(S): at 11:49

## 2020-09-24 RX ADMIN — OXYCODONE HYDROCHLORIDE 5 MILLIGRAM(S): 5 TABLET ORAL at 18:04

## 2020-09-24 RX ADMIN — SODIUM CHLORIDE 100 MILLILITER(S): 9 INJECTION, SOLUTION INTRAVENOUS at 08:55

## 2020-09-24 RX ADMIN — HEPARIN SODIUM 5000 UNIT(S): 5000 INJECTION INTRAVENOUS; SUBCUTANEOUS at 18:04

## 2020-09-24 RX ADMIN — HEPARIN SODIUM 5000 UNIT(S): 5000 INJECTION INTRAVENOUS; SUBCUTANEOUS at 05:16

## 2020-09-24 RX ADMIN — Medication 20 MILLIGRAM(S): at 11:49

## 2020-09-24 RX ADMIN — OXYCODONE HYDROCHLORIDE 5 MILLIGRAM(S): 5 TABLET ORAL at 02:37

## 2020-09-24 NOTE — PROGRESS NOTE ADULT - SUBJECTIVE AND OBJECTIVE BOX
`SURGERY DAILY PROGRESS NOTE:      S:   Patient seen and examined. No acute events overnight. Pain is well controlled, states he feels much better this morning. Has been on IV steroids. . GI fxn +/+. Fewer bouts of diarrhea. Tolerating clear liquid diet w/o N/V      O:   Exam:  Gen: NAD. A&Ox3.  Well developed, alert and cooperative.   Resp: No additional work of breathing.   Card: RR. No peripheral edema or pallor.   Abd: Soft, ND, minimally tender in lower quadrants.  Ext: WWP. Able to move all extremities equally.    Vital Signs Last 24 Hrs  T(C): 36.4 (24 Sep 2020 05:48), Max: 37 (23 Sep 2020 20:31)  T(F): 97.5 (24 Sep 2020 05:48), Max: 98.6 (23 Sep 2020 20:31)  HR: 63 (24 Sep 2020 05:48) (57 - 69)  BP: 118/64 (24 Sep 2020 05:48) (104/63 - 118/64)  BP(mean): --  RR: 18 (24 Sep 2020 05:48) (18 - 18)  SpO2: 97% (24 Sep 2020 05:48) (96% - 98%)      09-23-20 @ 07:01  -  09-24-20 @ 07:00  --------------------------------------------------------  IN: 2920 mL / OUT: 2 mL / NET: 2918 mL        LABS:                        8.4    7.38  )-----------( 314      ( 24 Sep 2020 06:56 )             27.5     09-23    131<L>  |  98  |  15  ----------------------------<  96  3.9   |  27  |  1.06    Ca    8.1<L>      23 Sep 2020 09:07  Phos  2.0     09-23  Mg     1.9     09-23    TPro  5.0<L>  /  Alb  2.6<L>  /  TBili  0.6  /  DBili  x   /  AST  8<L>  /  ALT  14  /  AlkPhos  30<L>  09-23    PT/INR - ( 23 Sep 2020 09:07 )   PT: 14.4 sec;   INR: 1.22 ratio         PTT - ( 23 Sep 2020 09:07 )  PTT:20.6 sec

## 2020-09-24 NOTE — PROGRESS NOTE ADULT - ASSESSMENT
· Assessment	  50 M w UC on Xeljanz p/w severe UC     Problem/Recommendation - 1:  Problem: Ulcerative colitis. Recommendation: Consistent with severe disease, slight improvement today, will continue to monitor response to steroids, if does not improve will need to consider rescue medication vs. surgery. Pt asking for diet, will attempt to advance. Case d/w outpatient IBD specialist Dr. Meeks.     Problem/Recommendation - 2:  ·  Problem: CAD (coronary artery disease).  Recommendation: no objection to antiplatelets.      Problem/Recommendation - 3:  ·  Problem: Anemia.  Recommendation: due to UC.  DVT prophylaxis should be given despite anemia.     Attending Attestation:   Differential diagnosis and plan of care discussed with patient after the evaluation  35 Minutes spent on total encounter of which more than fifty percent of the encounter was spent counseling and/or coordinating care by the attending physician.        Brandon Gray M.D.   Gastroenterology and Hepatology  Cell: [phone] .     · Assessment	  50 M w UC on Xeljanz p/w severe UC     Problem/Recommendation - 1:  Problem: Ulcerative colitis. Recommendation: Consistent with severe disease, slight improvement today, will continue to monitor response to steroids, if does not improve will need to consider rescue medication vs. surgery. Pt asking for diet, will attempt to advance. Case d/w outpatient IBD specialist Dr. Meeks. I d/c Xeljanz to avoid double immunosupression in the event a rescue medication is needed.      Problem/Recommendation - 2:  ·  Problem: CAD (coronary artery disease).  Recommendation: no objection to antiplatelets.      Problem/Recommendation - 3:  ·  Problem: Anemia.  Recommendation: due to UC.  DVT prophylaxis should be given despite anemia.     Attending Attestation:   Differential diagnosis and plan of care discussed with patient after the evaluation  35 Minutes spent on total encounter of which more than fifty percent of the encounter was spent counseling and/or coordinating care by the attending physician.        Brandon Gray M.D.   Gastroenterology and Hepatology  Cell: [phone] .

## 2020-09-24 NOTE — PROGRESS NOTE ADULT - ASSESSMENT
50M hx MI s/p MAGDA x3 (2014), ulcerative colitis (on Xeljanz), with lower abdominal pain x 6 weeks presenting with likely UC flare.     Plan:  - No urgent surgical intervention indicated at this time  - Patient remains symptomatic despite treatment with 3 different biologic agents and steroids, admitted for management of UC flare.   - Recommend GI consultation and medical optimization--> will attempt IV steroids and further biologics (adalmimumab) per GI recommendations.  - Should patient remain refractory/unresponsive to medical therapy, patient would benefit from proctocolectomy; patient and primary team aware of this possibility.   - Further recommendations and possible surgery pending clinical course, ideally operative intervention would be performed electively, without active inflammation    Randi Low, PGY2  Red Team Surgery 50M hx MI s/p MAGDA x3 (2014), ulcerative colitis (on Xeljanz), with lower abdominal pain x 6 weeks presenting with likely UC flare. Improving on IV steroids.     Plan:  - No urgent surgical intervention indicated at this time  - Patient remains symptomatic despite treatment with 3 different biologic agents and steroids, admitted for management of UC flare.   - Recommend GI consultation and medical optimization--> will attempt IV steroids and further biologics (adalmimumab) per GI recommendations.  - Should patient remain refractory/unresponsive to medical therapy, patient would benefit from proctocolectomy; patient and primary team aware of this possibility.   - Further recommendations and possible surgery pending clinical course, ideally operative intervention would be performed electively, without active inflammation    Randi Low, PGY2  Red Team Surgery

## 2020-09-24 NOTE — PROGRESS NOTE ADULT - SUBJECTIVE AND OBJECTIVE BOX
INTERNAL MEDICINE PROGRESS NOTE    Patient is a 50y old  Male who presents with a chief complaint of abdominal pain (24 Sep 2020 06:34)      Overnight events:  Subjective:    Medications:  MEDICATIONS  (STANDING):  aspirin enteric coated 81 milliGRAM(s) Oral daily  dicyclomine 20 milliGRAM(s) Oral four times a day before meals  heparin   Injectable 5000 Unit(s) SubCutaneous every 12 hours  influenza   Vaccine 0.5 milliLiter(s) IntraMuscular once  lactated ringers. 1000 milliLiter(s) (100 mL/Hr) IV Continuous <Continuous>  melatonin 3 milliGRAM(s) Oral at bedtime  methylPREDNISolone sodium succinate Injectable 20 milliGRAM(s) IV Push three times a day  tofacitinib Tablet 10 milliGRAM(s) Oral two times a day    MEDICATIONS  (PRN):  morphine  - Injectable 4 milliGRAM(s) IV Push every 6 hours PRN Severe Pain (7 - 10)  oxyCODONE    IR 5 milliGRAM(s) Oral every 6 hours PRN Moderate Pain (4 - 6)  oxyCODONE    IR 5 milliGRAM(s) Oral once PRN Severe Pain (7 - 10)      Objective:    Vitals: Vital Signs Last 24 Hrs  T(C): 36.4 (09-24-20 @ 05:48), Max: 37 (09-23-20 @ 20:31)  T(F): 97.5 (09-24-20 @ 05:48), Max: 98.6 (09-23-20 @ 20:31)  HR: 63 (09-24-20 @ 05:48) (57 - 69)  BP: 118/64 (09-24-20 @ 05:48) (104/63 - 118/64)  BP(mean): --  RR: 18 (09-24-20 @ 05:48) (18 - 18)  SpO2: 97% (09-24-20 @ 05:48) (96% - 98%)              I&O's Summary    23 Sep 2020 07:01  -  24 Sep 2020 07:00  --------------------------------------------------------  IN: 2920 mL / OUT: 2 mL / NET: 2918 mL        PHYSICAL EXAM:  GENERAL: NAD, conversant  CHEST/LUNG: Clear to auscultation bilaterally; No crackles, rhonchi, wheezing, or rubs  HEART: Regular rate and rhythm; No murmurs, rubs, or gallops  ABDOMEN: Soft, Nontender, Nondistended; Bowel sounds present  EXTREMITIES:  2+ Peripheral Pulses, No clubbing, cyanosis, or edema  SKIN: No rashes or lesions  NERVOUS SYSTEM:  Alert & Oriented, Good concentration                                                                                    LABS:  09-24    133<L>  |  98  |  14  ----------------------------<  127<H>  4.2   |  25  |  1.10  09-23    131<L>  |  98  |  15  ----------------------------<  96  3.9   |  27  |  1.06  09-22    132<L>  |  96  |  21  ----------------------------<  99  4.0   |  22  |  1.12    Ca    8.3<L>      24 Sep 2020 06:56  Ca    8.1<L>      23 Sep 2020 09:07  Ca    8.7      22 Sep 2020 23:25  Phos  3.5     09-24  Mg     2.0     09-24    TPro  4.8<L>  /  Alb  2.2<L>  /  TBili  0.3  /  DBili  x   /  AST  5<L>  /  ALT  12  /  AlkPhos  31<L>  09-24  TPro  5.0<L>  /  Alb  2.6<L>  /  TBili  0.6  /  DBili  x   /  AST  8<L>  /  ALT  14  /  AlkPhos  30<L>  09-23  TPro  6.2  /  Alb  3.2<L>  /  TBili  0.6  /  DBili  x   /  AST  11  /  ALT  19  /  AlkPhos  38<L>  09-22      PT/INR - ( 23 Sep 2020 09:07 )   PT: 14.4 sec;   INR: 1.22 ratio         PTT - ( 23 Sep 2020 09:07 )  PTT:20.6 sec                                              8.4    7.38  )-----------( 314      ( 24 Sep 2020 06:56 )             27.5                         9.3    7.27  )-----------( 352      ( 23 Sep 2020 09:07 )             30.5                         11.0   10.26 )-----------( 452      ( 22 Sep 2020 19:22 )             35.8     CAPILLARY BLOOD GLUCOSE            RECENT CULTURES:  09-23 @ 13:08  .Stool Feces  --  --  --    GI PCR Results: NOT detected  *******Please Note:*******  GI panel PCR evaluates for:  Campylobacter, Plesiomonas shigelloides, Salmonella,  Vibrio, Yersinia enterocolitica, Enteroaggregative  Escherichia coli (EAEC), Enteropathogenic E.coli (EPEC),  Enterotoxigenic E. coli (ETEC) lt/st, Shiga-like  toxin-producing E. coli (STEC) stx1/stx2,  Shigella/ Enteroinvasive E. coli (EIEC), Cryptosporidium,  Cyclospora cayetanensis, Entamoeba histolytica,  Giardia lamblia, Adenovirus F 40/41, Astrovirus,  Norovirus GI/GII, Rotavirus A, Sapovirus  --     RADIOLOGY & ADDITIONAL TESTS: N  Consultants involved in case: N         INTERNAL MEDICINE PROGRESS NOTE    Patient is a 50y old  Male who presents with a chief complaint of abdominal pain (24 Sep 2020 06:34)    Overnight events: NAEON. Pt required PRN morphine x 2, PRN oxycodone x1 ON. Started on solumedrol yesterday.  Subjective: Pt reports ongoing abdominal pain 7/10 this morning, not improved. Reports BM becoming more brown than bloody red and associated with more pain when having BM. Amenable to advancing diet. Has been ambulating; reports mild dizziness with ambulation but improving with IVF.    Medications:  MEDICATIONS  (STANDING):  aspirin enteric coated 81 milliGRAM(s) Oral daily  dicyclomine 20 milliGRAM(s) Oral four times a day before meals  heparin   Injectable 5000 Unit(s) SubCutaneous every 12 hours  influenza   Vaccine 0.5 milliLiter(s) IntraMuscular once  lactated ringers. 1000 milliLiter(s) (100 mL/Hr) IV Continuous <Continuous>  melatonin 3 milliGRAM(s) Oral at bedtime  methylPREDNISolone sodium succinate Injectable 20 milliGRAM(s) IV Push three times a day  tofacitinib Tablet 10 milliGRAM(s) Oral two times a day    MEDICATIONS  (PRN):  morphine  - Injectable 4 milliGRAM(s) IV Push every 6 hours PRN Severe Pain (7 - 10)  oxyCODONE    IR 5 milliGRAM(s) Oral every 6 hours PRN Moderate Pain (4 - 6)  oxyCODONE    IR 5 milliGRAM(s) Oral once PRN Severe Pain (7 - 10)      Objective:    Vitals: Vital Signs Last 24 Hrs  T(C): 36.4 (09-24-20 @ 05:48), Max: 37 (09-23-20 @ 20:31)  T(F): 97.5 (09-24-20 @ 05:48), Max: 98.6 (09-23-20 @ 20:31)  HR: 63 (09-24-20 @ 05:48) (57 - 69)  BP: 118/64 (09-24-20 @ 05:48) (104/63 - 118/64)  BP(mean): --  RR: 18 (09-24-20 @ 05:48) (18 - 18)  SpO2: 97% (09-24-20 @ 05:48) (96% - 98%)              I&O's Summary    23 Sep 2020 07:01  -  24 Sep 2020 07:00  --------------------------------------------------------  IN: 2920 mL / OUT: 2 mL / NET: 2918 mL        PHYSICAL EXAM:  GENERAL: NAD, conversant  CHEST/LUNG: Clear to auscultation bilaterally; No crackles, rhonchi, wheezing, or rubs  HEART: Regular rate and rhythm; No murmurs, rubs, or gallops  ABDOMEN: Soft, Nontender, Nondistended; Bowel sounds present  EXTREMITIES:  2+ Peripheral Pulses, No clubbing, cyanosis, or edema  SKIN: No rashes or lesions  NERVOUS SYSTEM:  Alert & Oriented, Good concentration                                                                                    LABS:  09-24    133<L>  |  98  |  14  ----------------------------<  127<H>  4.2   |  25  |  1.10  09-23    131<L>  |  98  |  15  ----------------------------<  96  3.9   |  27  |  1.06  09-22    132<L>  |  96  |  21  ----------------------------<  99  4.0   |  22  |  1.12    Ca    8.3<L>      24 Sep 2020 06:56  Ca    8.1<L>      23 Sep 2020 09:07  Ca    8.7      22 Sep 2020 23:25  Phos  3.5     09-24  Mg     2.0     09-24    TPro  4.8<L>  /  Alb  2.2<L>  /  TBili  0.3  /  DBili  x   /  AST  5<L>  /  ALT  12  /  AlkPhos  31<L>  09-24  TPro  5.0<L>  /  Alb  2.6<L>  /  TBili  0.6  /  DBili  x   /  AST  8<L>  /  ALT  14  /  AlkPhos  30<L>  09-23  TPro  6.2  /  Alb  3.2<L>  /  TBili  0.6  /  DBili  x   /  AST  11  /  ALT  19  /  AlkPhos  38<L>  09-22      PT/INR - ( 23 Sep 2020 09:07 )   PT: 14.4 sec;   INR: 1.22 ratio         PTT - ( 23 Sep 2020 09:07 )  PTT:20.6 sec                                     8.4    7.38  )-----------( 314      ( 24 Sep 2020 06:56 )             27.5                         9.3    7.27  )-----------( 352      ( 23 Sep 2020 09:07 )             30.5                         11.0   10.26 )-----------( 452      ( 22 Sep 2020 19:22 )             35.8     CAPILLARY BLOOD GLUCOSE            RECENT CULTURES:  09-23 @ 13:08  .Stool Feces  --  --  --    GI PCR Results: NOT detected  *******Please Note:*******  GI panel PCR evaluates for:  Campylobacter, Plesiomonas shigelloides, Salmonella,  Vibrio, Yersinia enterocolitica, Enteroaggregative  Escherichia coli (EAEC), Enteropathogenic E.coli (EPEC),  Enterotoxigenic E. coli (ETEC) lt/st, Shiga-like  toxin-producing E. coli (STEC) stx1/stx2,  Shigella/ Enteroinvasive E. coli (EIEC), Cryptosporidium,  Cyclospora cayetanensis, Entamoeba histolytica,  Giardia lamblia, Adenovirus F 40/41, Astrovirus,  Norovirus GI/GII, Rotavirus A, Sapovirus  --     RADIOLOGY & ADDITIONAL TESTS: N  Consultants involved in case: GI, colorectal surgery         INTERNAL MEDICINE PROGRESS NOTE    Patient is a 50y old  Male who presents with a chief complaint of abdominal pain (24 Sep 2020 06:34)    Overnight events: NAEON. Pt required PRN morphine x 2, PRN oxycodone x1 ON. Started on solumedrol yesterday.  Subjective: Pt reports ongoing abdominal pain 7/10 this morning, not improved. Reports having 3 BMs overnight and that BM is becoming more brown than bloody red; associated with more pain when having BM. Amenable to advancing diet. Has been ambulating; reports mild dizziness with ambulation but improving with IVF.    Medications:  MEDICATIONS  (STANDING):  aspirin enteric coated 81 milliGRAM(s) Oral daily  dicyclomine 20 milliGRAM(s) Oral four times a day before meals  heparin   Injectable 5000 Unit(s) SubCutaneous every 12 hours  influenza   Vaccine 0.5 milliLiter(s) IntraMuscular once  lactated ringers. 1000 milliLiter(s) (100 mL/Hr) IV Continuous <Continuous>  melatonin 3 milliGRAM(s) Oral at bedtime  methylPREDNISolone sodium succinate Injectable 20 milliGRAM(s) IV Push three times a day  tofacitinib Tablet 10 milliGRAM(s) Oral two times a day    MEDICATIONS  (PRN):  morphine  - Injectable 4 milliGRAM(s) IV Push every 6 hours PRN Severe Pain (7 - 10)  oxyCODONE    IR 5 milliGRAM(s) Oral every 6 hours PRN Moderate Pain (4 - 6)  oxyCODONE    IR 5 milliGRAM(s) Oral once PRN Severe Pain (7 - 10)      Objective:    Vitals: Vital Signs Last 24 Hrs  T(C): 36.4 (09-24-20 @ 05:48), Max: 37 (09-23-20 @ 20:31)  T(F): 97.5 (09-24-20 @ 05:48), Max: 98.6 (09-23-20 @ 20:31)  HR: 63 (09-24-20 @ 05:48) (57 - 69)  BP: 118/64 (09-24-20 @ 05:48) (104/63 - 118/64)  BP(mean): --  RR: 18 (09-24-20 @ 05:48) (18 - 18)  SpO2: 97% (09-24-20 @ 05:48) (96% - 98%)              I&O's Summary    23 Sep 2020 07:01  -  24 Sep 2020 07:00  --------------------------------------------------------  IN: 2920 mL / OUT: 2 mL / NET: 2918 mL        PHYSICAL EXAM:  GENERAL: NAD, conversant  CHEST/LUNG: Clear to auscultation bilaterally; No crackles, rhonchi, wheezing, or rubs  HEART: Regular rate and rhythm; No murmurs, rubs, or gallops  ABDOMEN: Soft, less distended than day prior, TTP at lower abdominal region; Bowel sounds present  EXTREMITIES:  2+ Peripheral Pulses, No clubbing, cyanosis, or edema  SKIN: No rashes or lesions  NERVOUS SYSTEM:  Alert & Oriented, Good concentration                                                                                          LABS:  09-24    133<L>  |  98  |  14  ----------------------------<  127<H>  4.2   |  25  |  1.10  09-23    131<L>  |  98  |  15  ----------------------------<  96  3.9   |  27  |  1.06  09-22    132<L>  |  96  |  21  ----------------------------<  99  4.0   |  22  |  1.12    Ca    8.3<L>      24 Sep 2020 06:56  Ca    8.1<L>      23 Sep 2020 09:07  Ca    8.7      22 Sep 2020 23:25  Phos  3.5     09-24  Mg     2.0     09-24    TPro  4.8<L>  /  Alb  2.2<L>  /  TBili  0.3  /  DBili  x   /  AST  5<L>  /  ALT  12  /  AlkPhos  31<L>  09-24  TPro  5.0<L>  /  Alb  2.6<L>  /  TBili  0.6  /  DBili  x   /  AST  8<L>  /  ALT  14  /  AlkPhos  30<L>  09-23  TPro  6.2  /  Alb  3.2<L>  /  TBili  0.6  /  DBili  x   /  AST  11  /  ALT  19  /  AlkPhos  38<L>  09-22      PT/INR - ( 23 Sep 2020 09:07 )   PT: 14.4 sec;   INR: 1.22 ratio         PTT - ( 23 Sep 2020 09:07 )  PTT:20.6 sec                                     8.4    7.38  )-----------( 314      ( 24 Sep 2020 06:56 )             27.5                         9.3    7.27  )-----------( 352      ( 23 Sep 2020 09:07 )             30.5                         11.0   10.26 )-----------( 452      ( 22 Sep 2020 19:22 )             35.8     CAPILLARY BLOOD GLUCOSE            RECENT CULTURES:  09-23 @ 13:08  .Stool Feces  --  --  --    GI PCR Results: NOT detected  *******Please Note:*******  GI panel PCR evaluates for:  Campylobacter, Plesiomonas shigelloides, Salmonella,  Vibrio, Yersinia enterocolitica, Enteroaggregative  Escherichia coli (EAEC), Enteropathogenic E.coli (EPEC),  Enterotoxigenic E. coli (ETEC) lt/st, Shiga-like  toxin-producing E. coli (STEC) stx1/stx2,  Shigella/ Enteroinvasive E. coli (EIEC), Cryptosporidium,  Cyclospora cayetanensis, Entamoeba histolytica,  Giardia lamblia, Adenovirus F 40/41, Astrovirus,  Norovirus GI/GII, Rotavirus A, Sapovirus  --     RADIOLOGY & ADDITIONAL TESTS: N  Consultants involved in case: GI, colorectal surgery

## 2020-09-24 NOTE — PROGRESS NOTE ADULT - PROBLEM SELECTOR PLAN 1
Dx UC 2010. Recently admitted to [facility] for management of UC symptoms. Colonoscopy showed inflammation throughout 2/3 of his colon. No surgical intervention at that time. Discharged on Xelijanz and prednisone 40mg qdaily. Now admitted for continued abdominal pain and diarrhea. Failed multiple biologic agents.  -s/p cipro and flagyl in the ED  -CT abdomen long segment continuous left-sided colitis from the transverse colon to the rectum with pericolonic inflammatory changes. Air distended transverse colon. Correlate clinically for toxic megacolon.  -seen by colorectal surgery, appreciate recs  -no urgent surgical intervention at this time  -would strongly consider elective proctocolectomy when inflammation has reduced  -GI consult with Cesar Fry [phone]  -pain control with IV morphine   -c/w prednisone 40mg qdaily  -c/w dicyclomine  -c/w cipro/flagyl  -check c. diff   -IVF maintenance  -Continuation of home xeljanz as per GI, pt has meds with him. -s/p cipro and flagyl in the ED  -CT abdomen long segment continuous left-sided colitis from the transverse colon to the rectum with pericolonic inflammatory changes. Air distended transverse colon. Correlate clinically for toxic megacolon.  -seen by colorectal surgery, appreciate recs  -no urgent surgical intervention at this time  -would strongly consider elective proctocolectomy when inflammation has reduced  -GI consult with Cesar Fry [phone]  -pain control with IV morphine   -c/w prednisone 40mg qdaily  -c/w dicyclomine  -c/w cipro/flagyl  -check c. diff   -IVF maintenance  -Continuation of home xeljanz as per GI, pt has meds with him. Improved with less distension and decreased bloody stools. BM likely more painful with more formed stool. Pain adequately managed with PRNs. Pending clinical improvement with solu-medrol, may try cyclosporine if not significantly improving.  - IV morphine, PO oxycodone - change PRN q6h to q4h   - c/w solumedrol 20mg TID, home xeljanz  - IVF maintenance  - monitor hemoglobin  - advance to low fiber diet  - start miralax q daily

## 2020-09-24 NOTE — PROGRESS NOTE ADULT - ASSESSMENT
50 year old male with pmhx MAGDA x 3 (2014), ulcerative colitis (on Xeljanz and prednisone) who is presenting with abdominal pain 2/2 UC 50 year old male with pmhx MAGDA x 3 (2014), ulcerative colitis (on Xeljanz and prednisone) who is presenting with abdominal pain 2/2 UC flare

## 2020-09-24 NOTE — PROGRESS NOTE ADULT - SUBJECTIVE AND OBJECTIVE BOX
Chief Complaint:  Patient is a 50y old  Male who presents with a chief complaint of abdominal pain (24 Sep 2020 08:34)      Interval Events:   abd pain only slightly improved  numerous bowel movements    Allergies:  Entyvio (Swelling)  penicillin (Pruritus; Rash)  Remicade (Other)      Hospital Medications:  aspirin enteric coated 81 milliGRAM(s) Oral daily  dicyclomine 20 milliGRAM(s) Oral four times a day before meals  heparin   Injectable 5000 Unit(s) SubCutaneous every 12 hours  influenza   Vaccine 0.5 milliLiter(s) IntraMuscular once  lactated ringers. 1000 milliLiter(s) IV Continuous <Continuous>  melatonin 3 milliGRAM(s) Oral at bedtime  methylPREDNISolone sodium succinate Injectable 20 milliGRAM(s) IV Push three times a day  morphine  - Injectable 4 milliGRAM(s) IV Push every 4 hours PRN  oxyCODONE    IR 5 milliGRAM(s) Oral every 4 hours PRN  polyethylene glycol 3350 17 Gram(s) Oral daily      PMHX/PSHX:  MI (myocardial infarction)    CAD (coronary artery disease)    Ulcerative colitis    S/P knee surgery        Family history:      ROS:     General:  No wt loss, fevers, chills, night sweats, fatigue,   Eyes:  Good vision, no reported pain  ENT:  No sore throat, pain, runny nose, dysphagia  CV:  No pain, palpitations, hypo/hypertension  Resp:  No dyspnea, cough, tachypnea, wheezing  GI:  See HPI  :  No pain, bleeding, incontinence, nocturia  Muscle:  No pain, weakness  Neuro:  No weakness, tingling, memory problems  Psych:  No fatigue, insomnia, mood problems, depression  Endocrine:  No polyuria, polydipsia, cold/heat intolerance  Heme:  No petechiae, ecchymosis, easy bruisability  Skin:  No rash, edema      PHYSICAL EXAM:     GENERAL:  Appears stated age, well-groomed, appears weak, no distress  HEENT:  NC/AT,  conjunctivae clear, sclera-anicteric  NECK: Trachea midline, supple  CHEST:  Full & symmetric excursion, no increased effort, breath sounds clear  HEART:  Regular rhythm, no manjeet/heave  ABDOMEN:  Soft, slightly tender, slight rebound non-distended, normoactive bowel sounds,  no masses ,no hepato-splenomegaly,   EXTREMITIES:  no cyanosis,clubbing or edema  SKIN:  No rash/erythema/petechiae, no jaundice  NEURO:  Alert, oriented, no asterixis  RECTAL: Deferred    Vital Signs:  Vital Signs Last 24 Hrs  T(C): 37.1 (24 Sep 2020 21:01), Max: 37.1 (24 Sep 2020 21:01)  T(F): 98.7 (24 Sep 2020 21:01), Max: 98.7 (24 Sep 2020 21:01)  HR: 64 (24 Sep 2020 21:01) (52 - 76)  BP: 136/64 (24 Sep 2020 21:01) (112/58 - 136/64)  BP(mean): --  RR: 18 (24 Sep 2020 21:01) (18 - 18)  SpO2: 97% (24 Sep 2020 21:01) (97% - 99%)  Daily     Daily     LABS:                        8.4    7.38  )-----------( 314      ( 24 Sep 2020 06:56 )             27.5     09-24    133<L>  |  98  |  14  ----------------------------<  127<H>  4.2   |  25  |  1.10    Ca    8.3<L>      24 Sep 2020 06:56  Phos  3.5     09-24  Mg     2.0     09-24    TPro  4.8<L>  /  Alb  2.2<L>  /  TBili  0.3  /  DBili  x   /  AST  5<L>  /  ALT  12  /  AlkPhos  31<L>  09-24    LIVER FUNCTIONS - ( 24 Sep 2020 06:56 )  Alb: 2.2 g/dL / Pro: 4.8 g/dL / ALK PHOS: 31 U/L / ALT: 12 U/L / AST: 5 U/L / GGT: x           PT/INR - ( 24 Sep 2020 10:04 )   PT: 15.7 sec;   INR: 1.35 ratio         PTT - ( 24 Sep 2020 10:04 )  PTT:29.0 sec        Imaging:

## 2020-09-25 ENCOUNTER — TRANSCRIPTION ENCOUNTER (OUTPATIENT)
Age: 50
End: 2020-09-25

## 2020-09-25 DIAGNOSIS — Z02.9 ENCOUNTER FOR ADMINISTRATIVE EXAMINATIONS, UNSPECIFIED: ICD-10-CM

## 2020-09-25 LAB
ALBUMIN SERPL ELPH-MCNC: 2.4 G/DL — LOW (ref 3.3–5)
ALP SERPL-CCNC: 31 U/L — LOW (ref 40–120)
ALT FLD-CCNC: 12 U/L — SIGNIFICANT CHANGE UP (ref 10–45)
ANION GAP SERPL CALC-SCNC: 7 MMOL/L — SIGNIFICANT CHANGE UP (ref 5–17)
APTT BLD: 27.9 SEC — SIGNIFICANT CHANGE UP (ref 27.5–35.5)
AST SERPL-CCNC: 9 U/L — LOW (ref 10–40)
BASOPHILS # BLD AUTO: 0 K/UL — SIGNIFICANT CHANGE UP (ref 0–0.2)
BASOPHILS NFR BLD AUTO: 0 % — SIGNIFICANT CHANGE UP (ref 0–2)
BILIRUB SERPL-MCNC: 0.3 MG/DL — SIGNIFICANT CHANGE UP (ref 0.2–1.2)
BUN SERPL-MCNC: 14 MG/DL — SIGNIFICANT CHANGE UP (ref 7–23)
CALCIUM SERPL-MCNC: 8.4 MG/DL — SIGNIFICANT CHANGE UP (ref 8.4–10.5)
CHLORIDE SERPL-SCNC: 99 MMOL/L — SIGNIFICANT CHANGE UP (ref 96–108)
CO2 SERPL-SCNC: 28 MMOL/L — SIGNIFICANT CHANGE UP (ref 22–31)
CREAT SERPL-MCNC: 1.04 MG/DL — SIGNIFICANT CHANGE UP (ref 0.5–1.3)
EOSINOPHIL # BLD AUTO: 0 K/UL — SIGNIFICANT CHANGE UP (ref 0–0.5)
EOSINOPHIL NFR BLD AUTO: 0 % — SIGNIFICANT CHANGE UP (ref 0–6)
GLUCOSE SERPL-MCNC: 133 MG/DL — HIGH (ref 70–99)
HCT VFR BLD CALC: 27.5 % — LOW (ref 39–50)
HGB BLD-MCNC: 8.4 G/DL — LOW (ref 13–17)
INR BLD: 1.18 RATIO — HIGH (ref 0.88–1.16)
LYMPHOCYTES # BLD AUTO: 0.33 K/UL — LOW (ref 1–3.3)
LYMPHOCYTES # BLD AUTO: 5.2 % — LOW (ref 13–44)
MAGNESIUM SERPL-MCNC: 2.2 MG/DL — SIGNIFICANT CHANGE UP (ref 1.6–2.6)
MANUAL SMEAR VERIFICATION: SIGNIFICANT CHANGE UP
MCHC RBC-ENTMCNC: 26.2 PG — LOW (ref 27–34)
MCHC RBC-ENTMCNC: 30.5 GM/DL — LOW (ref 32–36)
MCV RBC AUTO: 85.7 FL — SIGNIFICANT CHANGE UP (ref 80–100)
MONOCYTES # BLD AUTO: 0.28 K/UL — SIGNIFICANT CHANGE UP (ref 0–0.9)
MONOCYTES NFR BLD AUTO: 4.4 % — SIGNIFICANT CHANGE UP (ref 2–14)
NEUTROPHILS # BLD AUTO: 5.79 K/UL — SIGNIFICANT CHANGE UP (ref 1.8–7.4)
NEUTROPHILS NFR BLD AUTO: 76.5 % — SIGNIFICANT CHANGE UP (ref 43–77)
NEUTS BAND # BLD: 13.9 % — HIGH (ref 0–8)
PHOSPHATE SERPL-MCNC: 2.6 MG/DL — SIGNIFICANT CHANGE UP (ref 2.5–4.5)
PLAT MORPH BLD: NORMAL — SIGNIFICANT CHANGE UP
PLATELET # BLD AUTO: 312 K/UL — SIGNIFICANT CHANGE UP (ref 150–400)
POTASSIUM SERPL-MCNC: 4.5 MMOL/L — SIGNIFICANT CHANGE UP (ref 3.5–5.3)
POTASSIUM SERPL-SCNC: 4.5 MMOL/L — SIGNIFICANT CHANGE UP (ref 3.5–5.3)
PROT SERPL-MCNC: 4.8 G/DL — LOW (ref 6–8.3)
PROTHROM AB SERPL-ACNC: 13.8 SEC — HIGH (ref 10.6–13.6)
RBC # BLD: 3.21 M/UL — LOW (ref 4.2–5.8)
RBC # FLD: 17 % — HIGH (ref 10.3–14.5)
RBC BLD AUTO: SIGNIFICANT CHANGE UP
SODIUM SERPL-SCNC: 134 MMOL/L — LOW (ref 135–145)
WBC # BLD: 6.41 K/UL — SIGNIFICANT CHANGE UP (ref 3.8–10.5)
WBC # FLD AUTO: 6.41 K/UL — SIGNIFICANT CHANGE UP (ref 3.8–10.5)

## 2020-09-25 PROCEDURE — 99232 SBSQ HOSP IP/OBS MODERATE 35: CPT | Mod: GC

## 2020-09-25 RX ORDER — HYDROCORTISONE 20 MG
100 TABLET ORAL DAILY
Refills: 0 | Status: DISCONTINUED | OUTPATIENT
Start: 2020-09-25 | End: 2020-09-30

## 2020-09-25 RX ORDER — MESALAMINE 400 MG
1000 TABLET, DELAYED RELEASE (ENTERIC COATED) ORAL AT BEDTIME
Refills: 0 | Status: DISCONTINUED | OUTPATIENT
Start: 2020-09-25 | End: 2020-09-30

## 2020-09-25 RX ADMIN — MORPHINE SULFATE 4 MILLIGRAM(S): 50 CAPSULE, EXTENDED RELEASE ORAL at 06:47

## 2020-09-25 RX ADMIN — MORPHINE SULFATE 4 MILLIGRAM(S): 50 CAPSULE, EXTENDED RELEASE ORAL at 10:48

## 2020-09-25 RX ADMIN — Medication 81 MILLIGRAM(S): at 11:44

## 2020-09-25 RX ADMIN — OXYCODONE HYDROCHLORIDE 5 MILLIGRAM(S): 5 TABLET ORAL at 21:30

## 2020-09-25 RX ADMIN — MORPHINE SULFATE 4 MILLIGRAM(S): 50 CAPSULE, EXTENDED RELEASE ORAL at 11:08

## 2020-09-25 RX ADMIN — Medication 20 MILLIGRAM(S): at 21:32

## 2020-09-25 RX ADMIN — Medication 20 MILLIGRAM(S): at 17:00

## 2020-09-25 RX ADMIN — Medication 20 MILLIGRAM(S): at 13:21

## 2020-09-25 RX ADMIN — Medication 20 MILLIGRAM(S): at 05:18

## 2020-09-25 RX ADMIN — POLYETHYLENE GLYCOL 3350 17 GRAM(S): 17 POWDER, FOR SOLUTION ORAL at 11:44

## 2020-09-25 RX ADMIN — Medication 1000 MILLIGRAM(S): at 21:32

## 2020-09-25 RX ADMIN — Medication 20 MILLIGRAM(S): at 21:31

## 2020-09-25 RX ADMIN — MORPHINE SULFATE 4 MILLIGRAM(S): 50 CAPSULE, EXTENDED RELEASE ORAL at 05:44

## 2020-09-25 RX ADMIN — Medication 3 MILLIGRAM(S): at 21:32

## 2020-09-25 RX ADMIN — OXYCODONE HYDROCHLORIDE 5 MILLIGRAM(S): 5 TABLET ORAL at 19:26

## 2020-09-25 RX ADMIN — MORPHINE SULFATE 4 MILLIGRAM(S): 50 CAPSULE, EXTENDED RELEASE ORAL at 14:54

## 2020-09-25 RX ADMIN — MORPHINE SULFATE 4 MILLIGRAM(S): 50 CAPSULE, EXTENDED RELEASE ORAL at 15:12

## 2020-09-25 RX ADMIN — MORPHINE SULFATE 4 MILLIGRAM(S): 50 CAPSULE, EXTENDED RELEASE ORAL at 21:31

## 2020-09-25 RX ADMIN — Medication 20 MILLIGRAM(S): at 10:49

## 2020-09-25 RX ADMIN — MORPHINE SULFATE 4 MILLIGRAM(S): 50 CAPSULE, EXTENDED RELEASE ORAL at 01:24

## 2020-09-25 RX ADMIN — MORPHINE SULFATE 4 MILLIGRAM(S): 50 CAPSULE, EXTENDED RELEASE ORAL at 23:07

## 2020-09-25 RX ADMIN — HEPARIN SODIUM 5000 UNIT(S): 5000 INJECTION INTRAVENOUS; SUBCUTANEOUS at 05:18

## 2020-09-25 RX ADMIN — MORPHINE SULFATE 4 MILLIGRAM(S): 50 CAPSULE, EXTENDED RELEASE ORAL at 03:00

## 2020-09-25 NOTE — PROGRESS NOTE ADULT - SUBJECTIVE AND OBJECTIVE BOX
`SURGERY DAILY PROGRESS NOTE:      S:   Patient seen and examined.   No acute events overnight.   Patient feels much better this am. States his pain is nearly gone at this time.   GI fxn +/+.   Diet advanced, tolerated w/o nausea vomiting.      O:   Exam:  Gen: NAD. A&Ox3.  Well developed, alert and cooperative.   Resp: No additional work of breathing.   Card: RR. No peripheral edema or pallor.   Abd: Soft, ND, minimally tender.  Ext: WWP. Able to move all extremities equally.    Vital Signs Last 24 Hrs  T(C): 36.9 (25 Sep 2020 05:29), Max: 37.1 (24 Sep 2020 21:01)  T(F): 98.4 (25 Sep 2020 05:29), Max: 98.7 (24 Sep 2020 21:01)  HR: 59 (25 Sep 2020 05:29) (52 - 76)  BP: 124/74 (25 Sep 2020 05:29) (112/58 - 136/64)  BP(mean): --  RR: 18 (25 Sep 2020 05:29) (18 - 18)  SpO2: 98% (25 Sep 2020 05:29) (97% - 99%)      09-23-20 @ 07:01  -  09-24-20 @ 07:00  --------------------------------------------------------  IN: 2920 mL / OUT: 2 mL / NET: 2918 mL    09-24-20 @ 07:01  -  09-25-20 @ 06:36  --------------------------------------------------------  IN: 2820 mL / OUT: 0 mL / NET: 2820 mL        LABS:                        8.4    7.38  )-----------( 314      ( 24 Sep 2020 06:56 )             27.5     09-24    133<L>  |  98  |  14  ----------------------------<  127<H>  4.2   |  25  |  1.10    Ca    8.3<L>      24 Sep 2020 06:56  Phos  3.5     09-24  Mg     2.0     09-24    TPro  4.8<L>  /  Alb  2.2<L>  /  TBili  0.3  /  DBili  x   /  AST  5<L>  /  ALT  12  /  AlkPhos  31<L>  09-24    PT/INR - ( 24 Sep 2020 10:04 )   PT: 15.7 sec;   INR: 1.35 ratio         PTT - ( 24 Sep 2020 10:04 )  PTT:29.0 sec

## 2020-09-25 NOTE — PROGRESS NOTE ADULT - ASSESSMENT
50M hx MI s/p MAGDA x3 (2014), ulcerative colitis (on Xeljanz), with lower abdominal pain x 6 weeks presenting with likely UC flare despite tx with multiple biological agents and oral steroids. Now improving on IV steroids.     Plan:  - No urgent surgical intervention indicated at this time  - Patient is improving on IV steroids   - Appreciate GI recommendations--> d/c-ed Xeljanz, improving on IV steroids, no need for additional biologics at this time.   - Should patient remain refractory/unresponsive to medical therapy, patient would benefit from proctocolectomy; patient and primary team aware of this possibility.   - Further recommendations and possible surgery pending clinical course, ideally operative intervention would be performed electively, without active inflammation    Randi Low, PGY2  Red Team Surgery  [phone]

## 2020-09-25 NOTE — PROGRESS NOTE ADULT - SUBJECTIVE AND OBJECTIVE BOX
Rashel Blake, PGY1  Internal Medicine  Pager #: [phone]    Patient is a 50y old  Male who presents with a chief complaint of abdominal pain (25 Sep 2020 06:36)      SUBJECTIVE / OVERNIGHT EVENTS: RINKU Harp dc'ed per Dr. Gray yesterday.  ADDITIONAL REVIEW OF SYSTEMS: 10 point ROS negative except as stated per HPI.    MEDICATIONS  (STANDING):  aspirin enteric coated 81 milliGRAM(s) Oral daily  dicyclomine 20 milliGRAM(s) Oral four times a day before meals  heparin   Injectable 5000 Unit(s) SubCutaneous every 12 hours  influenza   Vaccine 0.5 milliLiter(s) IntraMuscular once  lactated ringers. 1000 milliLiter(s) (100 mL/Hr) IV Continuous <Continuous>  melatonin 3 milliGRAM(s) Oral at bedtime  methylPREDNISolone sodium succinate Injectable 20 milliGRAM(s) IV Push three times a day  polyethylene glycol 3350 17 Gram(s) Oral daily    MEDICATIONS  (PRN):  morphine  - Injectable 4 milliGRAM(s) IV Push every 4 hours PRN Severe Pain (7 - 10)  oxyCODONE    IR 5 milliGRAM(s) Oral every 4 hours PRN Moderate Pain (4 - 6)      CAPILLARY BLOOD GLUCOSE        I&O's Summary    24 Sep 2020 07:01  -  25 Sep 2020 07:00  --------------------------------------------------------  IN: 2820 mL / OUT: 0 mL / NET: 2820 mL        PHYSICAL EXAM:  Vital Signs Last 24 Hrs  T(C): 36.9 (25 Sep 2020 05:29), Max: 37.1 (24 Sep 2020 21:01)  T(F): 98.4 (25 Sep 2020 05:29), Max: 98.7 (24 Sep 2020 21:01)  HR: 59 (25 Sep 2020 05:29) (52 - 76)  BP: 124/74 (25 Sep 2020 05:29) (112/58 - 136/64)  BP(mean): --  RR: 18 (25 Sep 2020 05:29) (18 - 18)  SpO2: 98% (25 Sep 2020 05:29) (97% - 99%)  CONSTITUTIONAL: NAD, lying in bed comfortably  EYES: EOMI, PERRLA; conjunctiva and sclera clear  ENMT: Moist oral mucosa; normal dentition  NECK: Supple, no palpable masses  RESPIRATORY: Lungs clear to ascultation b/l; No rales, ronchi, or wheezing; Unlabored respirations  CARDIOVASCULAR: Regular rate and rhythm, normal S1 and S2, no murmurs, rubs, or gallops  ABDOMEN: Soft, nontender, nondistended, normal bowel sounds  MUSCULOSKELETAL: No joint swelling or tenderness to palpation  PSYCH: Affect appropriate  NEUROLOGY: AAOx3, CNs grossly intact  SKIN: No rashes; no palpable lesions    LABS:                        8.4    6.41  )-----------( 312      ( 25 Sep 2020 06:32 )             27.5     09-25    134<L>  |  99  |  14  ----------------------------<  133<H>  4.5   |  28  |  1.04    Ca    8.4      25 Sep 2020 06:32  Phos  2.6     09-25  Mg     2.2     09-25    TPro  4.8<L>  /  Alb  2.4<L>  /  TBili  0.3  /  DBili  x   /  AST  9<L>  /  ALT  12  /  AlkPhos  31<L>  09-25    PT/INR - ( 24 Sep 2020 10:04 )   PT: 15.7 sec;   INR: 1.35 ratio         PTT - ( 24 Sep 2020 10:04 )  PTT:29.0 sec          GI PCR Panel, Stool (collected 23 Sep 2020 13:08)  Source: .Stool Feces  Final Report (23 Sep 2020 18:59):    GI PCR Results: NOT detected    *******Please Note:*******    GI panel PCR evaluates for:    Campylobacter, Plesiomonas shigelloides, Salmonella,    Vibrio, Yersinia enterocolitica, Enteroaggregative    Escherichia coli (EAEC), Enteropathogenic E.coli (EPEC),    Enterotoxigenic E. coli (ETEC) lt/st, Shiga-like    toxin-producing E. coli (STEC) stx1/stx2,    Shigella/ Enteroinvasive E. coli (EIEC), Cryptosporidium,    Cyclospora cayetanensis, Entamoeba histolytica,    Giardia lamblia, Adenovirus F 40/41, Astrovirus,    Norovirus GI/GII, Rotavirus A, Sapovirus        RADIOLOGY & ADDITIONAL TESTS: Rashel Blake, PGY1  Internal Medicine  Pager #: [phone]    Patient is a 50y old  Male who presents with a chief complaint of abdominal pain (25 Sep 2020 06:36)      SUBJECTIVE / OVERNIGHT EVENTS: NAEON. Required PRN morphine x1, oxycodone x1 overnight. Katiuska dc'ed per Dr. Gray yesterday. Pt reports 2 BM's overnight, they were less painful & more brown. Pt reports 4-5/10 pain this morning and feels pain has improved. Reports he feels less distended.    ADDITIONAL REVIEW OF SYSTEMS: 10 point ROS negative except as stated per HPI.    MEDICATIONS  (STANDING):  aspirin enteric coated 81 milliGRAM(s) Oral daily  dicyclomine 20 milliGRAM(s) Oral four times a day before meals  heparin   Injectable 5000 Unit(s) SubCutaneous every 12 hours  influenza   Vaccine 0.5 milliLiter(s) IntraMuscular once  lactated ringers. 1000 milliLiter(s) (100 mL/Hr) IV Continuous <Continuous>  melatonin 3 milliGRAM(s) Oral at bedtime  methylPREDNISolone sodium succinate Injectable 20 milliGRAM(s) IV Push three times a day  polyethylene glycol 3350 17 Gram(s) Oral daily    MEDICATIONS  (PRN):  morphine  - Injectable 4 milliGRAM(s) IV Push every 4 hours PRN Severe Pain (7 - 10)  oxyCODONE    IR 5 milliGRAM(s) Oral every 4 hours PRN Moderate Pain (4 - 6)      CAPILLARY BLOOD GLUCOSE        I&O's Summary    24 Sep 2020 07:01  -  25 Sep 2020 07:00  --------------------------------------------------------  IN: 2820 mL / OUT: 0 mL / NET: 2820 mL        PHYSICAL EXAM:  Vital Signs Last 24 Hrs  T(C): 36.9 (25 Sep 2020 05:29), Max: 37.1 (24 Sep 2020 21:01)  T(F): 98.4 (25 Sep 2020 05:29), Max: 98.7 (24 Sep 2020 21:01)  HR: 59 (25 Sep 2020 05:29) (52 - 76)  BP: 124/74 (25 Sep 2020 05:29) (112/58 - 136/64)  BP(mean): --  RR: 18 (25 Sep 2020 05:29) (18 - 18)  SpO2: 98% (25 Sep 2020 05:29) (97% - 99%)  CONSTITUTIONAL: NAD, appears more comfortable than day prior  EYES: EOMI, PERRLA; conjunctiva and sclera clear  ENMT: Moist oral mucosa; normal dentition  NECK: Supple, no palpable masses  RESPIRATORY: Lungs clear to ascultation b/l; No rales, ronchi, or wheezing; Unlabored respirations  CARDIOVASCULAR: Regular rate and rhythm, normal S1 and S2, no murmurs, rubs, or gallops  ABDOMEN: Soft, TTP at lower abdomen, nondistended, normal bowel sounds  MUSCULOSKELETAL: No joint swelling or tenderness to palpation  PSYCH: Affect appropriate  NEUROLOGY: AAOx3, CNs grossly intact  SKIN: No rashes; no palpable lesions    LABS:                        8.4    6.41  )-----------( 312      ( 25 Sep 2020 06:32 )             27.5     09-25    134<L>  |  99  |  14  ----------------------------<  133<H>  4.5   |  28  |  1.04    Ca    8.4      25 Sep 2020 06:32  Phos  2.6     09-25  Mg     2.2     09-25    TPro  4.8<L>  /  Alb  2.4<L>  /  TBili  0.3  /  DBili  x   /  AST  9<L>  /  ALT  12  /  AlkPhos  31<L>  09-25    PT/INR - ( 24 Sep 2020 10:04 )   PT: 15.7 sec;   INR: 1.35 ratio         PTT - ( 24 Sep 2020 10:04 )  PTT:29.0 sec          GI PCR Panel, Stool (collected 23 Sep 2020 13:08)  Source: .Stool Feces  Final Report (23 Sep 2020 18:59):    GI PCR Results: NOT detected    *******Please Note:*******    GI panel PCR evaluates for:    Campylobacter, Plesiomonas shigelloides, Salmonella,    Vibrio, Yersinia enterocolitica, Enteroaggregative    Escherichia coli (EAEC), Enteropathogenic E.coli (EPEC),    Enterotoxigenic E. coli (ETEC) lt/st, Shiga-like    toxin-producing E. coli (STEC) stx1/stx2,    Shigella/ Enteroinvasive E. coli (EIEC), Cryptosporidium,    Cyclospora cayetanensis, Entamoeba histolytica,    Giardia lamblia, Adenovirus F 40/41, Astrovirus,    Norovirus GI/GII, Rotavirus A, Sapovirus        RADIOLOGY & ADDITIONAL TESTS: Rashel Blake, PGY1  Internal Medicine  Pager #: [phone]    Patient is a 50y old  Male who presents with a chief complaint of abdominal pain (25 Sep 2020 06:36)      SUBJECTIVE / OVERNIGHT EVENTS: NAEON. Required PRN morphine x1, oxycodone x1 overnight. Katiuska dc'ed per Dr. Gray yesterday. Pt reports 2 BM's overnight, they were less painful & more brown, but had another painful BM with more maroon color.    ADDITIONAL REVIEW OF SYSTEMS: 10 point ROS negative except as stated per HPI.    MEDICATIONS  (STANDING):  aspirin enteric coated 81 milliGRAM(s) Oral daily  dicyclomine 20 milliGRAM(s) Oral four times a day before meals  heparin   Injectable 5000 Unit(s) SubCutaneous every 12 hours  influenza   Vaccine 0.5 milliLiter(s) IntraMuscular once  lactated ringers. 1000 milliLiter(s) (100 mL/Hr) IV Continuous <Continuous>  melatonin 3 milliGRAM(s) Oral at bedtime  methylPREDNISolone sodium succinate Injectable 20 milliGRAM(s) IV Push three times a day  polyethylene glycol 3350 17 Gram(s) Oral daily    MEDICATIONS  (PRN):  morphine  - Injectable 4 milliGRAM(s) IV Push every 4 hours PRN Severe Pain (7 - 10)  oxyCODONE    IR 5 milliGRAM(s) Oral every 4 hours PRN Moderate Pain (4 - 6)      CAPILLARY BLOOD GLUCOSE        I&O's Summary    24 Sep 2020 07:01  -  25 Sep 2020 07:00  --------------------------------------------------------  IN: 2820 mL / OUT: 0 mL / NET: 2820 mL        PHYSICAL EXAM:  Vital Signs Last 24 Hrs  T(C): 36.9 (25 Sep 2020 05:29), Max: 37.1 (24 Sep 2020 21:01)  T(F): 98.4 (25 Sep 2020 05:29), Max: 98.7 (24 Sep 2020 21:01)  HR: 59 (25 Sep 2020 05:29) (52 - 76)  BP: 124/74 (25 Sep 2020 05:29) (112/58 - 136/64)  BP(mean): --  RR: 18 (25 Sep 2020 05:29) (18 - 18)  SpO2: 98% (25 Sep 2020 05:29) (97% - 99%)  CONSTITUTIONAL: NAD, appears more comfortable than day prior  EYES: EOMI, PERRLA; conjunctiva and sclera clear  ENMT: Moist oral mucosa; normal dentition  NECK: Supple, no palpable masses  RESPIRATORY: Lungs clear to ascultation b/l; No rales, ronchi, or wheezing; Unlabored respirations  CARDIOVASCULAR: Regular rate and rhythm, normal S1 and S2, no murmurs, rubs, or gallops  ABDOMEN: Soft, TTP at lower abdomen, nondistended, normal bowel sounds  MUSCULOSKELETAL: No joint swelling or tenderness to palpation  PSYCH: Affect appropriate  NEUROLOGY: AAOx3, CNs grossly intact  SKIN: No rashes; no palpable lesions    LABS:                        8.4    6.41  )-----------( 312      ( 25 Sep 2020 06:32 )             27.5     09-25    134<L>  |  99  |  14  ----------------------------<  133<H>  4.5   |  28  |  1.04    Ca    8.4      25 Sep 2020 06:32  Phos  2.6     09-25  Mg     2.2     09-25    TPro  4.8<L>  /  Alb  2.4<L>  /  TBili  0.3  /  DBili  x   /  AST  9<L>  /  ALT  12  /  AlkPhos  31<L>  09-25    PT/INR - ( 24 Sep 2020 10:04 )   PT: 15.7 sec;   INR: 1.35 ratio         PTT - ( 24 Sep 2020 10:04 )  PTT:29.0 sec          GI PCR Panel, Stool (collected 23 Sep 2020 13:08)  Source: .Stool Feces  Final Report (23 Sep 2020 18:59):    GI PCR Results: NOT detected    *******Please Note:*******    GI panel PCR evaluates for:    Campylobacter, Plesiomonas shigelloides, Salmonella,    Vibrio, Yersinia enterocolitica, Enteroaggregative    Escherichia coli (EAEC), Enteropathogenic E.coli (EPEC),    Enterotoxigenic E. coli (ETEC) lt/st, Shiga-like    toxin-producing E. coli (STEC) stx1/stx2,    Shigella/ Enteroinvasive E. coli (EIEC), Cryptosporidium,    Cyclospora cayetanensis, Entamoeba histolytica,    Giardia lamblia, Adenovirus F 40/41, Astrovirus,    Norovirus GI/GII, Rotavirus A, Sapovirus        RADIOLOGY & ADDITIONAL TESTS:

## 2020-09-25 NOTE — DISCHARGE NOTE PROVIDER - NSDCFUADDAPPT_GEN_ALL_CORE_FT
Follow-up with Primary Care and Gastroenterology as above. Follow-up with Primary Care, Gastroenterology, and Colorectal Surgery as above.

## 2020-09-25 NOTE — PROGRESS NOTE ADULT - SUBJECTIVE AND OBJECTIVE BOX
Chief Complaint:  Patient is a 50y old  Male who presents with a chief complaint of abdominal pain (25 Sep 2020 11:44)      Interval Events:   steady modest improvement. Less blood, less BM/more formed. No fevers.     Allergies:  Entyvio (Swelling)  penicillin (Pruritus; Rash)  Remicade (Other)      Hospital Medications:  aspirin enteric coated 81 milliGRAM(s) Oral daily  dicyclomine 20 milliGRAM(s) Oral four times a day before meals  heparin   Injectable 5000 Unit(s) SubCutaneous every 12 hours  hydrocortisone Enema 100 milliGRAM(s) Rectal daily  influenza   Vaccine 0.5 milliLiter(s) IntraMuscular once  lactated ringers. 1000 milliLiter(s) IV Continuous <Continuous>  melatonin 3 milliGRAM(s) Oral at bedtime  mesalamine Suppository 1000 milliGRAM(s) Rectal at bedtime  methylPREDNISolone sodium succinate Injectable 20 milliGRAM(s) IV Push three times a day  morphine  - Injectable 4 milliGRAM(s) IV Push every 4 hours PRN  oxyCODONE    IR 5 milliGRAM(s) Oral every 4 hours PRN  polyethylene glycol 3350 17 Gram(s) Oral daily      PMHX/PSHX:  MI (myocardial infarction)    CAD (coronary artery disease)    Ulcerative colitis    S/P knee surgery        Family history:      ROS:     General:  No wt loss, fevers, chills, night sweats, fatigue,   Eyes:  Good vision, no reported pain  ENT:  No sore throat, pain, runny nose, dysphagia  CV:  No pain, palpitations, hypo/hypertension  Resp:  No dyspnea, cough, tachypnea, wheezing  GI:  See HPI  :  No pain, bleeding, incontinence, nocturia  Muscle:  No pain, weakness  Neuro:  No weakness, tingling, memory problems  Psych:  No fatigue, insomnia, mood problems, depression  Endocrine:  No polyuria, polydipsia, cold/heat intolerance  Heme:  No petechiae, ecchymosis, easy bruisability  Skin:  No rash, edema      PHYSICAL EXAM:     GENERAL:  Appears stated age, well-groomed, well-nourished, no distress  HEENT:  NC/AT,  conjunctivae clear, sclera-anicteric  NECK: Trachea midline, supple  CHEST:  Full & symmetric excursion, no increased effort, breath sounds clear  HEART:  Regular rhythm, no manjeet/heave  ABDOMEN:  Soft, less-tender, non-distended, normoactive bowel sounds,  no masses ,no hepato-splenomegaly,   EXTREMITIES:  no cyanosis,clubbing or edema  SKIN:  No rash/erythema/petechiae, no jaundice  NEURO:  Alert, oriented, no asterixis  RECTAL: Deferred    Vital Signs:  Vital Signs Last 24 Hrs  T(C): 37.2 (25 Sep 2020 21:27), Max: 37.2 (25 Sep 2020 21:27)  T(F): 99 (25 Sep 2020 21:27), Max: 99 (25 Sep 2020 21:27)  HR: 61 (25 Sep 2020 21:27) (59 - 87)  BP: 122/70 (25 Sep 2020 21:27) (122/66 - 124/74)  BP(mean): --  RR: 18 (25 Sep 2020 21:27) (18 - 18)  SpO2: 96% (25 Sep 2020 21:27) (96% - 99%)  Daily     Daily     LABS:                        8.4    6.41  )-----------( 312      ( 25 Sep 2020 06:32 )             27.5     09-25    134<L>  |  99  |  14  ----------------------------<  133<H>  4.5   |  28  |  1.04    Ca    8.4      25 Sep 2020 06:32  Phos  2.6     09-25  Mg     2.2     09-25    TPro  4.8<L>  /  Alb  2.4<L>  /  TBili  0.3  /  DBili  x   /  AST  9<L>  /  ALT  12  /  AlkPhos  31<L>  09-25    LIVER FUNCTIONS - ( 25 Sep 2020 06:32 )  Alb: 2.4 g/dL / Pro: 4.8 g/dL / ALK PHOS: 31 U/L / ALT: 12 U/L / AST: 9 U/L / GGT: x           PT/INR - ( 25 Sep 2020 08:01 )   PT: 13.8 sec;   INR: 1.18 ratio         PTT - ( 25 Sep 2020 08:01 )  PTT:27.9 sec        Imaging:

## 2020-09-25 NOTE — PROGRESS NOTE ADULT - PROBLEM SELECTOR PLAN 1
Improved with less distension and decreased bloody stools. BM likely more painful with more formed stool. Pain adequately managed with PRNs. Pending clinical improvement with solu-medrol, may try cyclosporine if not significantly improving.  - IV morphine, PO oxycodone - change PRN q6h to q4h   - c/w solumedrol 20mg TID, home xeljanz  - IVF maintenance  - monitor hemoglobin  - advance to low fiber diet  - start miralax q daily Continues to improve symptomatically. Tolerating diet. Miralax seems beneficial.  Pending further clinical improvement with solu-medrol.  - c/w IV morphine, PO oxycodone PRNs  - c/w solumedrol 20mg TID  - IVF maintenance  - monitor hemoglobin  - c/w low fiber diet  - c/w miralax q daily Continues to improve symptomatically, though fluctuating. Tolerating diet. Miralax seems beneficial.  Pending further clinical improvement with solu-medrol.  - start mesalalmine per Dr. Gray (GI)  - c/w IV morphine, PO oxycodone PRNs  - c/w solumedrol 20mg TID  - IVF maintenance  - c/w low fiber diet  - c/w miralax q daily Continues to improve symptomatically, though fluctuating. Tolerating diet. Miralax seems beneficial.  Pending further clinical improvement with solu-medrol.  - start mesalamine suppository per Dr. Gray (GI)  - start hydrocortisone enema  - c/w IV morphine, PO oxycodone PRNs  - c/w solumedrol 20mg TID  - IVF maintenance  - c/w low fiber diet  - c/w miralax q daily

## 2020-09-25 NOTE — DISCHARGE NOTE PROVIDER - NSDCCPCAREPLAN_GEN_ALL_CORE_FT
PRINCIPAL DISCHARGE DIAGNOSIS  Diagnosis: Ulcerative colitis with complication, unspecified location  Assessment and Plan of Treatment:        PRINCIPAL DISCHARGE DIAGNOSIS  Diagnosis: Ulcerative colitis with complication, unspecified location  Assessment and Plan of Treatment: You had severely frequent and bloody diarrhea and abdominal pain due to an active flare. We started you on IV steroids and steroid + mesalamine enemas to reduce the flare. Xeljanz was discontinued. Because symptoms improved, ***/did not improve, cyclosporine was started.  - If you have severe abdominal pain and/or frequent and bloody diarrhea, speak with your gastroenterologist or go to the nearest emergency room.  - If you are dizzy, short of breath, or severely fatigued, go to the nearest emergency room.  - Please follow up with your gastroenterologist and primary care doctor.       PRINCIPAL DISCHARGE DIAGNOSIS  Diagnosis: Ulcerative colitis with complication, unspecified location  Assessment and Plan of Treatment: You had severely frequent and bloody diarrhea and abdominal pain due to an active flare. We started you on IV steroids and steroid + mesalamine enemas to reduce the flare. Xeljanz was discontinued. After you improved on IV steroids, you were transitioned to oral steroid. Prior to discharge, you were started on ***.  - If you have severe abdominal pain and/or frequent and bloody diarrhea, speak with your gastroenterologist or go to the nearest emergency room.  - If you are dizzy, short of breath, or severely fatigued, go to the nearest emergency room.  - Please follow up with your gastroenterologist and primary care doctor.       PRINCIPAL DISCHARGE DIAGNOSIS  Diagnosis: Ulcerative colitis with complication, unspecified location  Assessment and Plan of Treatment: You had severely frequent and bloody diarrhea and abdominal pain due to an active flare. We started you on IV steroids and steroid + mesalamine enemas to reduce the flare. Xeljanz was discontinued. After you improved on IV steroids, you were transitioned to oral steroid. You were instructed to re-start Xeljanz after discharge.  - If you have severe abdominal pain and/or frequent and bloody diarrhea, speak with your gastroenterologist or go to the nearest emergency room.  - If you are dizzy, short of breath, or severely fatigued, go to the nearest emergency room.  - Please follow up with your gastroenterologist and primary care doctor.

## 2020-09-25 NOTE — DISCHARGE NOTE PROVIDER - NSDCCPTREATMENT_GEN_ALL_CORE_FT
PRINCIPAL PROCEDURE  Procedure: CT abdomen pel wo con  Findings and Treatment: 9/22/20  LOWER CHEST: Right lower lobe calcified granuloma. No pleural effusion.  LIVER: Within normal limits.  BILE DUCTS: Normal caliber.  GALLBLADDER: Within normal limits.  SPLEEN: Within normal limits.  PANCREAS: Within normal limits.  ADRENALS: Within normal limits.  KIDNEYS/URETERS: Within normal limits.  BLADDER: Within normal limits.  REPRODUCTIVE ORGANS: Prostate within normal limits.  BOWEL: Long segment continuous left-sided colonic wall thickening extending from the transverse colon to the rectum with pericolonic inflammatory changes, compatible with colitis. The transverse colon is distended with air up to 6 cm.  No bowel obstruction. Appendix is normal. No free air or abscess.  VESSELS: Atherosclerotic changes.  RETROPERITONEUM/LYMPH NODES: No lymphadenopathy.  ABDOMINAL WALL: Tiny fat-containing umbilical hernia.  BONES: Grade 2 anterolisthesis of L5 on S1 due to bilateral spondylolysis/pars defects. L5-S1 disc degeneration near complete loss of disc height and vacuum change. Mild thoracic spondylosis with small anterior osteophyte formation.  Intramedullary nail track in the proximal right femoral diaphysis is partially imaged. Heterotopic bone formation along the right greater trochanter noted.  IMPRESSION:  Long segment continuous left-sided colitis from the transverse colon to the rectum with pericolonic inflammatory changes, in keeping with history of ulcerative colitis. Air distended transverse colon. Correlate clinically for toxic megacolon.

## 2020-09-25 NOTE — DISCHARGE NOTE PROVIDER - HOSPITAL COURSE
50 year old male with pmhx MAGDA x 3 (2014), ulcerative colitis (on Xeljanz and prednisone) who is presenting with abdominal pain 2/2 UC flare, now with improving pain.    # Ulcerative Colitis with flare      Patient is stable for discharge with close follow-up with gastroenterology and primary care.   50 year old male with pmhx MAGDA x 3 (2014), ulcerative colitis (on Xeljanz and prednisone) who is presenting with abdominal pain 2/2 UC flare, now with improving pain.    # Ulcerative Colitis with flare  Found to be in active flare with severely frequent, bloody diarrhea and abdominal pain. Followed by private GI & colorectal surgery. Started on solu-medrol and hydrocortisone suppository. Xeljanz discontinued. Symptoms improved and pt was *** / Symptoms did not improve, pt was started on cyclosporine.    #CAD, hx of MI s/p MAGDA x3  Stable, c/w home ASA 81mg daily.    Patient is stable for discharge with close follow-up with gastroenterology and primary care. 50 year old male with pmhx MAGDA x 3 (2014), ulcerative colitis (on Xeljanz and prednisone) who is presenting with abdominal pain 2/2 UC flare, now with improving pain.    # Ulcerative Colitis with flare  Found to be in active flare with severely frequent, bloody diarrhea and abdominal pain. Followed by private GI & colorectal surgery. Started on solu-medrol, Canasa, and Cortifoam. Xeljanz discontinued. Symptoms improved and pt was *** / Symptoms did not improve, pt was started on cyclosporine.    #CAD, hx of MI s/p MAGDA x3  Stable, c/w home ASA 81mg daily.    Patient is stable for discharge with close follow-up with gastroenterology and primary care. 50 year old male with pmhx MAGDA x 3 (2014), ulcerative colitis (on Xeljanz and prednisone) who is presenting with abdominal pain 2/2 UC flare, now with improving pain.    # Ulcerative Colitis with flare  Found to be in active flare with severely frequent, bloody diarrhea and abdominal pain. Followed by private GI & colorectal surgery. Started on solu-medrol, Canasa, and Cortifoam. Xeljanz discontinued. GI PCR and C diff negative. CT A/P ********** Completed 6 d of solu-medrol, transitioned to prednisone on day prior to discharge. Symptoms improved and pt was *** / Symptoms did not improve, pt was started on cyclosporine.    #CAD, hx of MI s/p MAGDA x3  Stable, c/w home ASA 81mg daily.    Patient is stable for discharge with close follow-up with gastroenterology and primary care. 50 year old male with pmhx MAGDA x 3 (2014), ulcerative colitis (on Xeljanz and prednisone) who is presenting with abdominal pain 2/2 UC flare, now with improving pain.    # Ulcerative Colitis with flare  Found to be in active flare with severely frequent, bloody diarrhea and abdominal pain. Followed by private GI & colorectal surgery. Started on solu-medrol, Canasa, and Cortifoam. Xeljanz discontinued. GI PCR and C diff negative. CT A/P was consistent with UC flare and showed left-sided colitis from transverse colon to rectum with pericolonic inflammatory changes. Completed 6 d of solu-medrol, transitioned to prednisone on day prior to discharge. Symptoms improved and pt was started on ***.    #CAD, hx of MI s/p MAGDA x3  Stable, c/w home ASA 81mg daily.    Patient is stable for discharge with close follow-up with gastroenterology and primary care. 50 year old male with pmhx MAGDA x 3 (2014), ulcerative colitis (on Xeljanz and prednisone) who is presenting with abdominal pain 2/2 UC flare, now with improving pain.    # Ulcerative Colitis with flare  Found to be in active flare with severely frequent, bloody diarrhea and abdominal pain. Followed by private GI & colorectal surgery. GI PCR and C diff negative. CT A/P was consistent with UC flare and showed left-sided colitis from transverse colon to rectum with pericolonic inflammatory changes.  Started on solu-medrol, Canasa, and Cortifoam. Xeljanz discontinued. Completed 6 d of solu-medrol, transitioned to prednisone on day prior to discharge. Symptoms improved and pt was started on ***.   - Pt should follow up with PCP, gastroenterology    #CAD, hx of MI s/p MAGDA x3  Stable, c/w home ASA 81mg daily.  - Pt should follow up with PCP    Patient is stable for discharge with close follow-up with gastroenterology and primary care. 50 year old male with pmhx MAGDA x 3 (2014), ulcerative colitis (on Xeljanz and prednisone) who is presenting with abdominal pain 2/2 UC flare, now with improving pain.    # Ulcerative Colitis with flare  Found to be in active flare with severely frequent, bloody diarrhea and abdominal pain. Followed by private GI & colorectal surgery. GI PCR and C diff negative. CT A/P was consistent with UC flare and showed left-sided colitis from transverse colon to rectum with pericolonic inflammatory changes.  Started on solu-medrol, Canasa, and Cortifoam. Xeljanz discontinued. Completed 6 d of solu-medrol, transitioned to prednisone on day prior to discharge. Pt was also re-started on Xeljanz on discharge.   - Pt should follow up with PCP, gastroenterology, colorectal surgery    #CAD, hx of MI s/p MAGDA x3  Stable, c/w home ASA 81mg daily.  - Pt should follow up with PCP    Patient is stable for discharge with close follow-up with gastroenterology and primary care.

## 2020-09-25 NOTE — DISCHARGE NOTE PROVIDER - NSDCMRMEDTOKEN_GEN_ALL_CORE_FT
aspirin 81 mg oral tablet:   dicyclomine 10 mg oral capsule: 2 cap(s) orally 4 times a day  mesalamine: foam  mesalamine 1000 mg rectal suppository: 1 suppository(ies) rectal once a day (at bedtime)  predniSONE: 40 milligram(s) orally once a day  tofacitinib 10 mg oral tablet: 1 tab(s) orally 2 times a day   aspirin 81 mg oral tablet:   dicyclomine 10 mg oral capsule: 2 cap(s) orally 4 times a day  tofacitinib 10 mg oral tablet: 1 tab(s) orally 2 times a day   aspirin 81 mg oral tablet:   Canasa 1000 mg rectal suppository: 1 suppository(ies) rectal once a day (at bedtime)  Deltasone 20 mg oral tablet: 2 tab(s) orally once a day  dicyclomine 10 mg oral capsule: 2 cap(s) orally 4 times a day  hydrocortisone 100 mg/60 mL rectal suspension: 60 milliliter(s) rectal once a day  oxyCODONE 5 mg oral tablet: 1 tab(s) orally every 6 hours, As Needed -Moderate Pain (4 - 6) MDD:20 mg  pantoprazole 40 mg oral delayed release tablet: 1 tab(s) orally once a day (before a meal)  tofacitinib 10 mg oral tablet: 1 tab(s) orally 2 times a day

## 2020-09-25 NOTE — PROGRESS NOTE ADULT - ASSESSMENT
· Assessment	  50 M w UC on Xeljanz p/w severe UC     Problem/Recommendation - 1:  Problem: Ulcerative colitis. Recommendation: Consistent with severe disease, sustained trend of improvement. Continue IV steroids for now.   -I added cortifoam and Canasa as I suspect much of patient's symptoms are rectal (tenesumus/dyschezia)     Problem/Recommendation - 2:  ·  Problem: CAD (coronary artery disease).  Recommendation: no objection to antiplatelets.      Problem/Recommendation - 3:  ·  Problem: Anemia.  Recommendation: due to UC.  DVT prophylaxis should be given despite anemia.     Attending Attestation:   Differential diagnosis and plan of care discussed with patient after the evaluation  35 Minutes spent on total encounter of which more than fifty percent of the encounter was spent counseling and/or coordinating care by the attending physician.        Brandon Gray M.D.   Gastroenterology and Hepatology  Cell: [phone] .

## 2020-09-25 NOTE — DISCHARGE NOTE PROVIDER - PROVIDER TOKENS
PROVIDER:[TOKEN:[[id number]],FOLLOWUP:[1 week],ESTABLISHEDPATIENT:[T]],FREE:[LAST:[Peyton],FIRST:[Veto],PHONE:[phone]:[(   )    -]] PROVIDER:[TOKEN:[[id number]],FOLLOWUP:[1 week],ESTABLISHEDPATIENT:[T]],FREE:[LAST:[Peyton],FIRST:[Veto],PHONE:[phone]:[(   )    -]],PROVIDER:[TOKEN:[[id number]]]

## 2020-09-25 NOTE — DISCHARGE NOTE PROVIDER - CARE PROVIDERS DIRECT ADDRESSES
,DirectAddress_Unknown,DirectAddress_Unknown ,DirectAddress_Unknown,DirectAddress_Unknown,[email or web address]

## 2020-09-25 NOTE — DISCHARGE NOTE PROVIDER - CARE PROVIDER_API CALL
Cesar Meeks  GASTROENTEROLOGY  [address], [address]  [place] [address]  Phone: [phone]  Fax: [phone]  Established Patient  Follow Up Time: 1 week    Veto Todd  Phone: [phone]  Fax: (   )    -  Follow Up Time:    Cesar Meeks  GASTROENTEROLOGY  [address], [address]  [place] [address]  Phone: [phone]  Fax: [phone]  Established Patient  Follow Up Time: 1 week    Veto Todd  Phone: [phone]  Fax: (   )    -  Follow Up Time:     Juice Dennison  COLON/RECTAL SURGERY  [facility], [address] [place] [address]  Phone: [phone]  Fax: [phone]  Follow Up Time:

## 2020-09-25 NOTE — PROGRESS NOTE ADULT - ASSESSMENT
50 year old male with pmhx MAGDA x 3 (2014), ulcerative colitis (on Xeljanz and prednisone) who is presenting with abdominal pain 2/2 UC flare 50 year old male with pmhx MAGDA x 3 (2014), ulcerative colitis (on Xeljanz and prednisone) who is presenting with abdominal pain 2/2 UC flare, now with improving pain.

## 2020-09-26 LAB
ALBUMIN SERPL ELPH-MCNC: 2.6 G/DL — LOW (ref 3.3–5)
ALP SERPL-CCNC: 35 U/L — LOW (ref 40–120)
ALT FLD-CCNC: 17 U/L — SIGNIFICANT CHANGE UP (ref 10–45)
ANION GAP SERPL CALC-SCNC: 9 MMOL/L — SIGNIFICANT CHANGE UP (ref 5–17)
APTT BLD: 27.3 SEC — LOW (ref 27.5–35.5)
AST SERPL-CCNC: 14 U/L — SIGNIFICANT CHANGE UP (ref 10–40)
BILIRUB SERPL-MCNC: 0.2 MG/DL — SIGNIFICANT CHANGE UP (ref 0.2–1.2)
BUN SERPL-MCNC: 15 MG/DL — SIGNIFICANT CHANGE UP (ref 7–23)
CALCIUM SERPL-MCNC: 8.4 MG/DL — SIGNIFICANT CHANGE UP (ref 8.4–10.5)
CHLORIDE SERPL-SCNC: 96 MMOL/L — SIGNIFICANT CHANGE UP (ref 96–108)
CO2 SERPL-SCNC: 27 MMOL/L — SIGNIFICANT CHANGE UP (ref 22–31)
CREAT SERPL-MCNC: 0.99 MG/DL — SIGNIFICANT CHANGE UP (ref 0.5–1.3)
GLUCOSE SERPL-MCNC: 126 MG/DL — HIGH (ref 70–99)
HBV CORE AB SER-ACNC: SIGNIFICANT CHANGE UP
HBV SURFACE AG SER-ACNC: SIGNIFICANT CHANGE UP
HCT VFR BLD CALC: 27.7 % — LOW (ref 39–50)
HCT VFR BLD CALC: 29.7 % — LOW (ref 39–50)
HGB BLD-MCNC: 8.3 G/DL — LOW (ref 13–17)
HGB BLD-MCNC: 9.1 G/DL — LOW (ref 13–17)
INR BLD: 1.11 RATIO — SIGNIFICANT CHANGE UP (ref 0.88–1.16)
MAGNESIUM SERPL-MCNC: 2.1 MG/DL — SIGNIFICANT CHANGE UP (ref 1.6–2.6)
MCHC RBC-ENTMCNC: 26 PG — LOW (ref 27–34)
MCHC RBC-ENTMCNC: 27.1 PG — SIGNIFICANT CHANGE UP (ref 27–34)
MCHC RBC-ENTMCNC: 30 GM/DL — LOW (ref 32–36)
MCHC RBC-ENTMCNC: 30.6 GM/DL — LOW (ref 32–36)
MCV RBC AUTO: 86.8 FL — SIGNIFICANT CHANGE UP (ref 80–100)
MCV RBC AUTO: 88.4 FL — SIGNIFICANT CHANGE UP (ref 80–100)
NRBC # BLD: 0 /100 WBCS — SIGNIFICANT CHANGE UP (ref 0–0)
NRBC # BLD: 0 /100 WBCS — SIGNIFICANT CHANGE UP (ref 0–0)
PHOSPHATE SERPL-MCNC: 2.2 MG/DL — LOW (ref 2.5–4.5)
PLATELET # BLD AUTO: 203 K/UL — SIGNIFICANT CHANGE UP (ref 150–400)
PLATELET # BLD AUTO: 308 K/UL — SIGNIFICANT CHANGE UP (ref 150–400)
POTASSIUM SERPL-MCNC: 4.3 MMOL/L — SIGNIFICANT CHANGE UP (ref 3.5–5.3)
POTASSIUM SERPL-SCNC: 4.3 MMOL/L — SIGNIFICANT CHANGE UP (ref 3.5–5.3)
PROT SERPL-MCNC: 4.9 G/DL — LOW (ref 6–8.3)
PROTHROM AB SERPL-ACNC: 13.1 SEC — SIGNIFICANT CHANGE UP (ref 10.6–13.6)
RBC # BLD: 3.19 M/UL — LOW (ref 4.2–5.8)
RBC # BLD: 3.36 M/UL — LOW (ref 4.2–5.8)
RBC # FLD: 17.2 % — HIGH (ref 10.3–14.5)
RBC # FLD: 17.4 % — HIGH (ref 10.3–14.5)
SODIUM SERPL-SCNC: 132 MMOL/L — LOW (ref 135–145)
WBC # BLD: 6.61 K/UL — SIGNIFICANT CHANGE UP (ref 3.8–10.5)
WBC # BLD: 7.27 K/UL — SIGNIFICANT CHANGE UP (ref 3.8–10.5)
WBC # FLD AUTO: 6.61 K/UL — SIGNIFICANT CHANGE UP (ref 3.8–10.5)
WBC # FLD AUTO: 7.27 K/UL — SIGNIFICANT CHANGE UP (ref 3.8–10.5)

## 2020-09-26 PROCEDURE — 99233 SBSQ HOSP IP/OBS HIGH 50: CPT | Mod: GC

## 2020-09-26 RX ORDER — OXYCODONE HYDROCHLORIDE 5 MG/1
5 TABLET ORAL ONCE
Refills: 0 | Status: DISCONTINUED | OUTPATIENT
Start: 2020-09-26 | End: 2020-09-26

## 2020-09-26 RX ORDER — SODIUM,POTASSIUM PHOSPHATES 278-250MG
1 POWDER IN PACKET (EA) ORAL
Refills: 0 | Status: COMPLETED | OUTPATIENT
Start: 2020-09-26 | End: 2020-09-27

## 2020-09-26 RX ADMIN — OXYCODONE HYDROCHLORIDE 5 MILLIGRAM(S): 5 TABLET ORAL at 05:03

## 2020-09-26 RX ADMIN — Medication 1000 MILLIGRAM(S): at 22:14

## 2020-09-26 RX ADMIN — POLYETHYLENE GLYCOL 3350 17 GRAM(S): 17 POWDER, FOR SOLUTION ORAL at 11:23

## 2020-09-26 RX ADMIN — MORPHINE SULFATE 4 MILLIGRAM(S): 50 CAPSULE, EXTENDED RELEASE ORAL at 03:15

## 2020-09-26 RX ADMIN — MORPHINE SULFATE 4 MILLIGRAM(S): 50 CAPSULE, EXTENDED RELEASE ORAL at 10:20

## 2020-09-26 RX ADMIN — OXYCODONE HYDROCHLORIDE 5 MILLIGRAM(S): 5 TABLET ORAL at 04:13

## 2020-09-26 RX ADMIN — Medication 20 MILLIGRAM(S): at 05:20

## 2020-09-26 RX ADMIN — Medication 20 MILLIGRAM(S): at 21:18

## 2020-09-26 RX ADMIN — Medication 20 MILLIGRAM(S): at 14:31

## 2020-09-26 RX ADMIN — MORPHINE SULFATE 4 MILLIGRAM(S): 50 CAPSULE, EXTENDED RELEASE ORAL at 19:36

## 2020-09-26 RX ADMIN — Medication 3 MILLIGRAM(S): at 21:18

## 2020-09-26 RX ADMIN — MORPHINE SULFATE 4 MILLIGRAM(S): 50 CAPSULE, EXTENDED RELEASE ORAL at 15:20

## 2020-09-26 RX ADMIN — OXYCODONE HYDROCHLORIDE 5 MILLIGRAM(S): 5 TABLET ORAL at 21:52

## 2020-09-26 RX ADMIN — Medication 100 MILLIGRAM(S): at 05:27

## 2020-09-26 RX ADMIN — OXYCODONE HYDROCHLORIDE 5 MILLIGRAM(S): 5 TABLET ORAL at 05:59

## 2020-09-26 RX ADMIN — Medication 81 MILLIGRAM(S): at 11:23

## 2020-09-26 RX ADMIN — Medication 1 PACKET(S): at 18:17

## 2020-09-26 RX ADMIN — MORPHINE SULFATE 4 MILLIGRAM(S): 50 CAPSULE, EXTENDED RELEASE ORAL at 15:35

## 2020-09-26 RX ADMIN — Medication 20 MILLIGRAM(S): at 18:17

## 2020-09-26 RX ADMIN — MORPHINE SULFATE 4 MILLIGRAM(S): 50 CAPSULE, EXTENDED RELEASE ORAL at 10:04

## 2020-09-26 RX ADMIN — OXYCODONE HYDROCHLORIDE 5 MILLIGRAM(S): 5 TABLET ORAL at 05:20

## 2020-09-26 RX ADMIN — OXYCODONE HYDROCHLORIDE 5 MILLIGRAM(S): 5 TABLET ORAL at 21:22

## 2020-09-26 RX ADMIN — MORPHINE SULFATE 4 MILLIGRAM(S): 50 CAPSULE, EXTENDED RELEASE ORAL at 03:44

## 2020-09-26 RX ADMIN — MORPHINE SULFATE 4 MILLIGRAM(S): 50 CAPSULE, EXTENDED RELEASE ORAL at 20:06

## 2020-09-26 RX ADMIN — Medication 20 MILLIGRAM(S): at 11:23

## 2020-09-26 NOTE — PROGRESS NOTE ADULT - SUBJECTIVE AND OBJECTIVE BOX
SUBJECTIVE:  Reports pain well controlled   Denies nausea, vomiting  Ambulating independently    OBJECTIVE:  Vital Signs Last 24 Hrs  T(C): 36.9 (26 Sep 2020 04:44), Max: 37.2 (25 Sep 2020 21:27)  T(F): 98.4 (26 Sep 2020 04:44), Max: 99 (25 Sep 2020 21:27)  HR: 56 (26 Sep 2020 04:44) (56 - 87)  BP: 128/64 (26 Sep 2020 04:44) (122/66 - 128/64)  BP(mean): --  RR: 18 (26 Sep 2020 04:44) (18 - 18)  SpO2: 98% (26 Sep 2020 04:44) (96% - 99%)    Physical Examination:  GEN: NAD, resting quietly  NEURO: AAOx3, CN II-XII grossly intact, no focal deficits  PULM: symmetric chest rise bilaterally, no increased WOB  ABD: soft, nontender, nondistended  EXTR: no LE erythema, moving all extremities      LABS:                        8.3    6.61  )-----------( 308      ( 26 Sep 2020 06:32 )             27.7       09-26    132<L>  |  96  |  15  ----------------------------<  126<H>  4.3   |  27  |  0.99    Ca    8.4      26 Sep 2020 06:31  Phos  2.2     09-26  Mg     2.1     09-26    TPro  4.9<L>  /  Alb  2.6<L>  /  TBili  0.2  /  DBili  x   /  AST  14  /  ALT  17  /  AlkPhos  35<L>  09-26        IMAGING:  []    A/P:

## 2020-09-26 NOTE — PROGRESS NOTE ADULT - SUBJECTIVE AND OBJECTIVE BOX
spoke  pt by phone will evaluate in person later today   Chief Complaint:  Patient is a 50y old  Male who presents with a chief complaint of abdominal pain (26 Sep 2020 14:38)      Interval Events:   overall feels improved but still w severe abdominal pain mostly when passing BM's.  Allergies:  Entyvio (Swelling)  penicillin (Pruritus; Rash)  Remicade (Other)      Hospital Medications:  aspirin enteric coated 81 milliGRAM(s) Oral daily  heparin   Injectable 5000 Unit(s) SubCutaneous every 12 hours  hydrocortisone Enema 100 milliGRAM(s) Rectal daily  influenza   Vaccine 0.5 milliLiter(s) IntraMuscular once  lactated ringers. 1000 milliLiter(s) IV Continuous <Continuous>  melatonin 3 milliGRAM(s) Oral at bedtime  mesalamine Suppository 1000 milliGRAM(s) Rectal at bedtime  methylPREDNISolone sodium succinate Injectable 20 milliGRAM(s) IV Push three times a day  morphine  - Injectable 4 milliGRAM(s) IV Push every 4 hours PRN  oxyCODONE    IR 5 milliGRAM(s) Oral every 4 hours PRN  potassium phosphate / sodium phosphate Powder (PHOS-NaK) 1 Packet(s) Oral three times a day with meals      PMHX/PSHX:  MI (myocardial infarction)    CAD (coronary artery disease)    Ulcerative colitis    S/P knee surgery        Family history:      ROS:     General:  No wt loss, fevers, chills, night sweats, fatigue,   Eyes:  Good vision, no reported pain  ENT:  No sore throat, pain, runny nose, dysphagia  CV:  No pain, palpitations, hypo/hypertension  Resp:  No dyspnea, cough, tachypnea, wheezing  GI:  See HPI  :  No pain, bleeding, incontinence, nocturia  Muscle:  No pain, weakness  Neuro:  No weakness, tingling, memory problems  Psych:  No fatigue, insomnia, mood problems, depression  Endocrine:  No polyuria, polydipsia, cold/heat intolerance  Heme:  No petechiae, ecchymosis, easy bruisability  Skin:  No rash, edema      PHYSICAL EXAM:     GENERAL:  Appears stated age, well-groomed, well-nourished, no distress  HEENT:  NC/AT,  conjunctivae clear, sclera-anicteric  NECK: Trachea midline, supple  CHEST:  Full & symmetric excursion, no increased effort, breath sounds clear  HEART:  Regular rhythm, no manjeet/heave  ABDOMEN:  Soft, mildly-tender, non-distended, normoactive bowel sounds,  no masses ,no hepato-splenomegaly,   EXTREMITIES:  no cyanosis,clubbing or edema  SKIN:  No rash/erythema/petechiae, no jaundice  NEURO:  Alert, oriented, no asterixis  RECTAL: Deferred    Vital Signs:  Vital Signs Last 24 Hrs  T(C): 36.8 (26 Sep 2020 21:20), Max: 36.9 (26 Sep 2020 04:44)  T(F): 98.3 (26 Sep 2020 21:20), Max: 98.4 (26 Sep 2020 04:44)  HR: 60 (26 Sep 2020 21:24) (47 - 60)  BP: 134/73 (26 Sep 2020 21:20) (115/68 - 134/73)  BP(mean): --  RR: 18 (26 Sep 2020 21:20) (18 - 18)  SpO2: 96% (26 Sep 2020 21:20) (96% - 98%)  Daily     Daily     LABS:                        9.1    7.27  )-----------( 203      ( 26 Sep 2020 14:32 )             29.7     09-26    132<L>  |  96  |  15  ----------------------------<  126<H>  4.3   |  27  |  0.99    Ca    8.4      26 Sep 2020 06:31  Phos  2.2     09-26  Mg     2.1     09-26    TPro  4.9<L>  /  Alb  2.6<L>  /  TBili  0.2  /  DBili  x   /  AST  14  /  ALT  17  /  AlkPhos  35<L>  09-26    LIVER FUNCTIONS - ( 26 Sep 2020 06:31 )  Alb: 2.6 g/dL / Pro: 4.9 g/dL / ALK PHOS: 35 U/L / ALT: 17 U/L / AST: 14 U/L / GGT: x           PT/INR - ( 26 Sep 2020 09:07 )   PT: 13.1 sec;   INR: 1.11 ratio         PTT - ( 26 Sep 2020 09:07 )  PTT:27.3 sec        Imaging:

## 2020-09-26 NOTE — PROGRESS NOTE ADULT - ASSESSMENT
50M hx MI s/p MAGDA x3 (2014), ulcerative colitis (on Xeljanz), with lower abdominal pain x 6 weeks presenting with likely UC flare despite tx with multiple biological agents and oral steroids. Now improving on IV steroids.     Plan:  - No urgent surgical intervention indicated at this time  - Patient is improving on IV steroids   - Appreciate GI recommendations--> d/c-ed Xeljanz, improving on IV steroids, no need for additional biologics at this time.   - Should patient remain refractory/unresponsive to medical therapy, patient would benefit from proctocolectomy; patient and primary team aware of this possibility.   - Further recommendations and possible surgery pending clinical course, ideally operative intervention would be performed electively, without active inflammation    Red Team Surgery  [phone]

## 2020-09-26 NOTE — PROGRESS NOTE ADULT - SUBJECTIVE AND OBJECTIVE BOX
Jay Licea MD, PGY-2  [place] Contact Information  [place] Pager: [phone]   After 7 PM on weekdays and 12 PM on weekends, for URGENT issues, low teams page #[phone], high teams #[phone]   --------------------------------------------------------------------------------------------  [facility] Contact Information  [place] Pager: [phone]  After 7 PM on weekdays and 12 PM on weekends, for URGENT issues, low teams #[id number], high teams #[phone]  ---------------------------------------------------------------------------------------------  Patient is a 50y old  Male who presents with a chief complaint of abdominal pain (26 Sep 2020 08:39)      SUBJECTIVE / OVERNIGHT EVENTS: Continued hematochezia with severe abdominal pain at rest and with defecation. Current pain level 4/10. No other acute events or complaints including headache, n/v, chest pain, sob.      MEDICATIONS  (STANDING):  aspirin enteric coated 81 milliGRAM(s) Oral daily  dicyclomine 20 milliGRAM(s) Oral four times a day before meals  heparin   Injectable 5000 Unit(s) SubCutaneous every 12 hours  hydrocortisone Enema 100 milliGRAM(s) Rectal daily  influenza   Vaccine 0.5 milliLiter(s) IntraMuscular once  lactated ringers. 1000 milliLiter(s) (100 mL/Hr) IV Continuous <Continuous>  melatonin 3 milliGRAM(s) Oral at bedtime  mesalamine Suppository 1000 milliGRAM(s) Rectal at bedtime  methylPREDNISolone sodium succinate Injectable 20 milliGRAM(s) IV Push three times a day  polyethylene glycol 3350 17 Gram(s) Oral daily    MEDICATIONS  (PRN):  morphine  - Injectable 4 milliGRAM(s) IV Push every 4 hours PRN Severe Pain (7 - 10)  oxyCODONE    IR 5 milliGRAM(s) Oral every 4 hours PRN Moderate Pain (4 - 6)      CAPILLARY BLOOD GLUCOSE    I&O's Summary    25 Sep 2020 07:01  -  26 Sep 2020 07:00  --------------------------------------------------------  IN: 2400 mL / OUT: 0 mL / NET: 2400 mL    PHYSICAL EXAM:  Vital Signs Last 24 Hrs  T(C): 36.9 (26 Sep 2020 11:04), Max: 37.2 (25 Sep 2020 21:27)  T(F): 98.4 (26 Sep 2020 11:04), Max: 99 (25 Sep 2020 21:27)  HR: 56 (26 Sep 2020 11:04) (56 - 61)  BP: 115/68 (26 Sep 2020 11:04) (115/68 - 128/64)  RR: 18 (26 Sep 2020 11:04) (18 - 18)  SpO2: 97% (26 Sep 2020 11:04) (96% - 98%)      CONSTITUTIONAL: in pain lying in bed  EYES: EOMI, PERRLA; conjunctiva and sclera clear  ENMT: MMM  NECK: Supple, no palpable masses  RESPIRATORY: CTABL  CARDIOVASCULAR: Regular rate and rhythm, normal S1 and S2, no murmurs, rubs, or gallops  ABDOMEN: soft, ttp LLQ, nontender elsewhere, hyperactive bowel sounds  MUSCULOSKELETAL: No joint swelling or tenderness to palpation  NEUROLOGY: AAOx3, CNs grossly intact  SKIN: No rashes; no palpable lesions    LABS:                        8.3    6.61  )-----------( 308      ( 26 Sep 2020 06:32 )             27.7     09-26    132<L>  |  96  |  15  ----------------------------<  126<H>  4.3   |  27  |  0.99    Ca    8.4      26 Sep 2020 06:31  Phos  2.2     09-26  Mg     2.1     09-26    TPro  4.9<L>  /  Alb  2.6<L>  /  TBili  0.2  /  DBili  x   /  AST  14  /  ALT  17  /  AlkPhos  35<L>  09-26    PT/INR - ( 26 Sep 2020 09:07 )   PT: 13.1 sec;   INR: 1.11 ratio      PTT - ( 26 Sep 2020 09:07 )  PTT:27.3 sec    GI PCR Panel, Stool (collected 23 Sep 2020 13:08)  Source: .Stool Feces  Final Report (23 Sep 2020 18:59):    GI PCR Results: NOT detected    *******Please Note:*******    GI panel PCR evaluates for:    Campylobacter, Plesiomonas shigelloides, Salmonella,    Vibrio, Yersinia enterocolitica, Enteroaggregative    Escherichia coli (EAEC), Enteropathogenic E.coli (EPEC),    Enterotoxigenic E. coli (ETEC) lt/st, Shiga-like    toxin-producing E. coli (STEC) stx1/stx2,    Shigella/ Enteroinvasive E. coli (EIEC), Cryptosporidium,    Cyclospora cayetanensis, Entamoeba histolytica,    Giardia lamblia, Adenovirus F 40/41, Astrovirus,    Norovirus GI/GII, Rotavirus A, Sapovirus    RADIOLOGY & ADDITIONAL TESTS:  Results Reviewed:   Imaging Personally Reviewed:  Electrocardiogram Personally Reviewed:

## 2020-09-26 NOTE — PROGRESS NOTE ADULT - ASSESSMENT
50 year old male with pmhx MAGDA x 3 (2014), ulcerative colitis (on Xeljanz and prednisone) who is presenting with abdominal pain 2/2 UC flare, with continued pain and hematochezia.

## 2020-09-26 NOTE — PROGRESS NOTE ADULT - ASSESSMENT
· Assessment	  50 M w UC on Xeljanz p/w severe UC     Problem/Recommendation - 1:  Problem: Ulcerative colitis. Recommendation:Per report still 8-10 loose BM/day  -I spoke to Dr. Meeks today, he recommends that we give a total of 5-6 days of IV steroids, but if not significantly improved at that point, he would recommend surgery. I will discuss this w patient later today.  -I added cortifoam and Canasa as I suspect much of patient's symptoms are rectal (tenesumus/dyschezia)     Problem/Recommendation - 2:  ·  Problem: CAD (coronary artery disease).  Recommendation: no objection to antiplatelets.      Problem/Recommendation - 3:  ·  Problem: Anemia.  Recommendation: due to UC.  DVT prophylaxis should be given despite anemia.     Attending Attestation:   Differential diagnosis and plan of care discussed with patient after the evaluation  35 Minutes spent on total encounter of which more than fifty percent of the encounter was spent counseling and/or coordinating care by the attending physician.        Brandon Gray M.D.   Gastroenterology and Hepatology  Cell: [phone] .     · Assessment	  50 M w UC on Xeljanz p/w severe UC     Problem/Recommendation - 1:  Problem: Ulcerative colitis. Recommendation:Per report still 8-10 loose BM/day  -I spoke to Dr. Meeks today, he recommends that we give a total of 5-6 days of IV steroids, but if not significantly improved at that point, he would recommend surgery. I will discuss this w patient later today.  -I added cortifoam and Canasa as I suspect much of patient's symptoms are rectal (tenesumus/dyschezia)  -low threshold for rpt abd imaging      Problem/Recommendation - 2:  ·  Problem: CAD (coronary artery disease).  Recommendation: no objection to antiplatelets.      Problem/Recommendation - 3:  ·  Problem: Anemia.  Recommendation: due to UC.  DVT prophylaxis should be given despite anemia.     Attending Attestation:   Differential diagnosis and plan of care discussed with patient after the evaluation  35 Minutes spent on total encounter of which more than fifty percent of the encounter was spent counseling and/or coordinating care by the attending physician.        Brandon Gray M.D.   Gastroenterology and Hepatology  Cell: [phone] .

## 2020-09-26 NOTE — PROGRESS NOTE ADULT - PROBLEM SELECTOR PLAN 1
Continues to improve symptomatically, though fluctuating. Tolerating diet. Miralax seems beneficial.  Pending further clinical improvement with solu-medrol.  - start mesalamine suppository per Dr. Gray (GI)  - start hydrocortisone enema  - c/w IV morphine, PO oxycodone PRNs  - c/w solumedrol 20mg TID  - IVF maintenance  - c/w low fiber diet  - c/w miralax q daily

## 2020-09-27 LAB
ALBUMIN SERPL ELPH-MCNC: 2.6 G/DL — LOW (ref 3.3–5)
ALP SERPL-CCNC: 36 U/L — LOW (ref 40–120)
ALT FLD-CCNC: 24 U/L — SIGNIFICANT CHANGE UP (ref 10–45)
ANION GAP SERPL CALC-SCNC: 10 MMOL/L — SIGNIFICANT CHANGE UP (ref 5–17)
APTT BLD: 27.4 SEC — LOW (ref 27.5–35.5)
AST SERPL-CCNC: 14 U/L — SIGNIFICANT CHANGE UP (ref 10–40)
BILIRUB SERPL-MCNC: 0.2 MG/DL — SIGNIFICANT CHANGE UP (ref 0.2–1.2)
BUN SERPL-MCNC: 13 MG/DL — SIGNIFICANT CHANGE UP (ref 7–23)
CALCIUM SERPL-MCNC: 8.5 MG/DL — SIGNIFICANT CHANGE UP (ref 8.4–10.5)
CHLORIDE SERPL-SCNC: 98 MMOL/L — SIGNIFICANT CHANGE UP (ref 96–108)
CO2 SERPL-SCNC: 27 MMOL/L — SIGNIFICANT CHANGE UP (ref 22–31)
CREAT SERPL-MCNC: 0.99 MG/DL — SIGNIFICANT CHANGE UP (ref 0.5–1.3)
GAMMA INTERFERON BACKGROUND BLD IA-ACNC: 0.01 IU/ML — SIGNIFICANT CHANGE UP
GLUCOSE SERPL-MCNC: 119 MG/DL — HIGH (ref 70–99)
HCT VFR BLD CALC: 28 % — LOW (ref 39–50)
HGB BLD-MCNC: 8.7 G/DL — LOW (ref 13–17)
INR BLD: 1.14 RATIO — SIGNIFICANT CHANGE UP (ref 0.88–1.16)
M TB IFN-G BLD-IMP: ABNORMAL
M TB IFN-G CD4+ BCKGRND COR BLD-ACNC: 0 IU/ML — SIGNIFICANT CHANGE UP
M TB IFN-G CD4+CD8+ BCKGRND COR BLD-ACNC: 0 IU/ML — SIGNIFICANT CHANGE UP
MAGNESIUM SERPL-MCNC: 2.1 MG/DL — SIGNIFICANT CHANGE UP (ref 1.6–2.6)
MCHC RBC-ENTMCNC: 27 PG — SIGNIFICANT CHANGE UP (ref 27–34)
MCHC RBC-ENTMCNC: 31.1 GM/DL — LOW (ref 32–36)
MCV RBC AUTO: 87 FL — SIGNIFICANT CHANGE UP (ref 80–100)
NRBC # BLD: 0 /100 WBCS — SIGNIFICANT CHANGE UP (ref 0–0)
PHOSPHATE SERPL-MCNC: 2.4 MG/DL — LOW (ref 2.5–4.5)
PLATELET # BLD AUTO: 305 K/UL — SIGNIFICANT CHANGE UP (ref 150–400)
POTASSIUM SERPL-MCNC: 4.3 MMOL/L — SIGNIFICANT CHANGE UP (ref 3.5–5.3)
POTASSIUM SERPL-SCNC: 4.3 MMOL/L — SIGNIFICANT CHANGE UP (ref 3.5–5.3)
PROT SERPL-MCNC: 5 G/DL — LOW (ref 6–8.3)
PROTHROM AB SERPL-ACNC: 13.4 SEC — SIGNIFICANT CHANGE UP (ref 10.6–13.6)
QUANT TB PLUS MITOGEN MINUS NIL: 0.04 IU/ML — SIGNIFICANT CHANGE UP
RBC # BLD: 3.22 M/UL — LOW (ref 4.2–5.8)
RBC # FLD: 17.3 % — HIGH (ref 10.3–14.5)
SODIUM SERPL-SCNC: 135 MMOL/L — SIGNIFICANT CHANGE UP (ref 135–145)
WBC # BLD: 6.52 K/UL — SIGNIFICANT CHANGE UP (ref 3.8–10.5)
WBC # FLD AUTO: 6.52 K/UL — SIGNIFICANT CHANGE UP (ref 3.8–10.5)

## 2020-09-27 PROCEDURE — 99233 SBSQ HOSP IP/OBS HIGH 50: CPT | Mod: GC

## 2020-09-27 RX ORDER — PANTOPRAZOLE SODIUM 20 MG/1
40 TABLET, DELAYED RELEASE ORAL
Refills: 0 | Status: DISCONTINUED | OUTPATIENT
Start: 2020-09-27 | End: 2020-09-30

## 2020-09-27 RX ADMIN — Medication 1 PACKET(S): at 08:55

## 2020-09-27 RX ADMIN — OXYCODONE HYDROCHLORIDE 5 MILLIGRAM(S): 5 TABLET ORAL at 03:08

## 2020-09-27 RX ADMIN — Medication 3 MILLIGRAM(S): at 21:08

## 2020-09-27 RX ADMIN — Medication 20 MILLIGRAM(S): at 05:00

## 2020-09-27 RX ADMIN — MORPHINE SULFATE 4 MILLIGRAM(S): 50 CAPSULE, EXTENDED RELEASE ORAL at 17:31

## 2020-09-27 RX ADMIN — MORPHINE SULFATE 4 MILLIGRAM(S): 50 CAPSULE, EXTENDED RELEASE ORAL at 00:11

## 2020-09-27 RX ADMIN — Medication 1000 MILLIGRAM(S): at 21:09

## 2020-09-27 RX ADMIN — MORPHINE SULFATE 4 MILLIGRAM(S): 50 CAPSULE, EXTENDED RELEASE ORAL at 00:41

## 2020-09-27 RX ADMIN — MORPHINE SULFATE 4 MILLIGRAM(S): 50 CAPSULE, EXTENDED RELEASE ORAL at 05:01

## 2020-09-27 RX ADMIN — OXYCODONE HYDROCHLORIDE 5 MILLIGRAM(S): 5 TABLET ORAL at 21:45

## 2020-09-27 RX ADMIN — OXYCODONE HYDROCHLORIDE 5 MILLIGRAM(S): 5 TABLET ORAL at 13:21

## 2020-09-27 RX ADMIN — Medication 20 MILLIGRAM(S): at 13:20

## 2020-09-27 RX ADMIN — Medication 100 MILLIGRAM(S): at 05:01

## 2020-09-27 RX ADMIN — MORPHINE SULFATE 4 MILLIGRAM(S): 50 CAPSULE, EXTENDED RELEASE ORAL at 05:00

## 2020-09-27 RX ADMIN — OXYCODONE HYDROCHLORIDE 5 MILLIGRAM(S): 5 TABLET ORAL at 08:30

## 2020-09-27 RX ADMIN — Medication 20 MILLIGRAM(S): at 21:08

## 2020-09-27 RX ADMIN — OXYCODONE HYDROCHLORIDE 5 MILLIGRAM(S): 5 TABLET ORAL at 03:38

## 2020-09-27 RX ADMIN — OXYCODONE HYDROCHLORIDE 5 MILLIGRAM(S): 5 TABLET ORAL at 21:16

## 2020-09-27 RX ADMIN — MORPHINE SULFATE 4 MILLIGRAM(S): 50 CAPSULE, EXTENDED RELEASE ORAL at 11:46

## 2020-09-27 RX ADMIN — MORPHINE SULFATE 4 MILLIGRAM(S): 50 CAPSULE, EXTENDED RELEASE ORAL at 12:15

## 2020-09-27 RX ADMIN — OXYCODONE HYDROCHLORIDE 5 MILLIGRAM(S): 5 TABLET ORAL at 14:00

## 2020-09-27 RX ADMIN — Medication 1 PACKET(S): at 11:46

## 2020-09-27 RX ADMIN — MORPHINE SULFATE 4 MILLIGRAM(S): 50 CAPSULE, EXTENDED RELEASE ORAL at 18:00

## 2020-09-27 RX ADMIN — Medication 81 MILLIGRAM(S): at 11:46

## 2020-09-27 RX ADMIN — OXYCODONE HYDROCHLORIDE 5 MILLIGRAM(S): 5 TABLET ORAL at 08:01

## 2020-09-27 NOTE — PROGRESS NOTE ADULT - ASSESSMENT
· Assessment	  50 M w UC on Xeljanz p/w severe UC     Problem/Recommendation - 1:  Problem: Ulcerative colitis. Recommendation:Pt feels an improvement today  -I spoke to Dr. Meeks today, he recommends that we give a total of 5-6 days of IV steroids, but if not significantly improved at that point, he would recommend surgery. This was discussed w patient. Today is day 4-5. Will reassess tomorrow.  -cortifoam and canasa  -low threshold for rpt abd imaging      Problem/Recommendation - 2:  ·  Problem: CAD (coronary artery disease).  Recommendation: no objection to antiplatelets.      Problem/Recommendation - 3:  ·  Problem: Anemia.  Recommendation: due to UC.  DVT prophylaxis should be given despite anemia.     Attending Attestation:   Differential diagnosis and plan of care discussed with patient after the evaluation  35 Minutes spent on total encounter of which more than fifty percent of the encounter was spent counseling and/or coordinating care by the attending physician.        Brandon Gray M.D.   Gastroenterology and Hepatology  Cell: [phone] .     · Assessment	  50 M w UC on Xeljanz p/w severe UC     Problem/Recommendation - 1:  Problem: Ulcerative colitis. Recommendation:Pt feels an improvement today  -I spoke to Dr. Meeks today, he recommends that we give a total of 5-6 days of IV steroids, but if not significantly improved at that point, he would recommend surgery. This was discussed w patient. Today is day 4-5. Will reassess tomorrow.  -cortifoam and canasa  -low threshold for rpt abd imaging   -Rpt ESR?CRP ordered for tomorrow     Problem/Recommendation - 2:  ·  Problem: CAD (coronary artery disease).  Recommendation: no objection to antiplatelets.      Problem/Recommendation - 3:  ·  Problem: Anemia.  Recommendation: due to UC.  DVT prophylaxis should be given despite anemia.     Attending Attestation:   Differential diagnosis and plan of care discussed with patient after the evaluation  35 Minutes spent on total encounter of which more than fifty percent of the encounter was spent counseling and/or coordinating care by the attending physician.        Brandon Gray M.D.   Gastroenterology and Hepatology  Cell: [phone] .

## 2020-09-27 NOTE — PROGRESS NOTE ADULT - ASSESSMENT
50M hx MI s/p MAGDA x3 (2014), ulcerative colitis (on Xeljanz), with lower abdominal pain x 6 weeks presenting with likely UC flare despite tx with multiple biological agents and oral steroids. Now improving on IV steroids.     Plan:  - Patient is improving on day 4 of 5 on IV steroids. will continue to monitor for clinical improvement.  - Will discuss with GI if patient is to need surgery.     Red Team Surgery  [phone]

## 2020-09-27 NOTE — PROGRESS NOTE ADULT - SUBJECTIVE AND OBJECTIVE BOX
Chief Complaint:  Patient is a 50y old  Male who presents with a chief complaint of abdominal pain (27 Sep 2020 09:13)      Interval Events:   pain and stool consistency improved today    Allergies:  Entyvio (Swelling)  penicillin (Pruritus; Rash)  Remicade (Other)      Hospital Medications:  aspirin enteric coated 81 milliGRAM(s) Oral daily  heparin   Injectable 5000 Unit(s) SubCutaneous every 12 hours  hydrocortisone Enema 100 milliGRAM(s) Rectal daily  influenza   Vaccine 0.5 milliLiter(s) IntraMuscular once  lactated ringers. 1000 milliLiter(s) IV Continuous <Continuous>  melatonin 3 milliGRAM(s) Oral at bedtime  mesalamine Suppository 1000 milliGRAM(s) Rectal at bedtime  methylPREDNISolone sodium succinate Injectable 20 milliGRAM(s) IV Push three times a day  morphine  - Injectable 4 milliGRAM(s) IV Push every 4 hours PRN  oxyCODONE    IR 5 milliGRAM(s) Oral every 4 hours PRN      PMHX/PSHX:  MI (myocardial infarction)    CAD (coronary artery disease)    Ulcerative colitis    S/P knee surgery        Family history:      ROS:     General:  No wt loss, fevers, chills, night sweats, fatigue,   Eyes:  Good vision, no reported pain  ENT:  No sore throat, pain, runny nose, dysphagia  CV:  No pain, palpitations, hypo/hypertension  Resp:  No dyspnea, cough, tachypnea, wheezing  GI:  See HPI  :  No pain, bleeding, incontinence, nocturia  Muscle:  No pain, weakness  Neuro:  No weakness, tingling, memory problems  Psych:  No fatigue, insomnia, mood problems, depression  Endocrine:  No polyuria, polydipsia, cold/heat intolerance  Heme:  No petechiae, ecchymosis, easy bruisability  Skin:  No rash, edema      PHYSICAL EXAM:     GENERAL:  Appears stated age, well-groomed, well-nourished, no distress  HEENT:  NC/AT,  conjunctivae clear, sclera-anicteric  NECK: Trachea midline, supple  CHEST:  Full & symmetric excursion, no increased effort, breath sounds clear  HEART:  Regular rhythm, no manjeet/heave  ABDOMEN:  Soft, non-tender, non-distended, normoactive bowel sounds,  no masses ,no hepato-splenomegaly,   EXTREMITIES:  no cyanosis,clubbing or edema  SKIN:  No rash/erythema/petechiae, no jaundice  NEURO:  Alert, oriented, no asterixis  RECTAL: Deferred    Vital Signs:  Vital Signs Last 24 Hrs  T(C): 37 (27 Sep 2020 11:00), Max: 37.1 (27 Sep 2020 04:23)  T(F): 98.6 (27 Sep 2020 11:00), Max: 98.8 (27 Sep 2020 04:23)  HR: 64 (27 Sep 2020 11:00) (47 - 64)  BP: 118/68 (27 Sep 2020 11:00) (118/68 - 134/73)  BP(mean): --  RR: 16 (27 Sep 2020 11:00) (16 - 18)  SpO2: 98% (27 Sep 2020 11:00) (96% - 98%)  Daily     Daily     LABS:                        8.7    6.52  )-----------( 305      ( 27 Sep 2020 06:13 )             28.0     09-27    135  |  98  |  13  ----------------------------<  119<H>  4.3   |  27  |  0.99    Ca    8.5      27 Sep 2020 06:13  Phos  2.4     09-27  Mg     2.1     09-27    TPro  5.0<L>  /  Alb  2.6<L>  /  TBili  0.2  /  DBili  x   /  AST  14  /  ALT  24  /  AlkPhos  36<L>  09-27    LIVER FUNCTIONS - ( 27 Sep 2020 06:13 )  Alb: 2.6 g/dL / Pro: 5.0 g/dL / ALK PHOS: 36 U/L / ALT: 24 U/L / AST: 14 U/L / GGT: x           PT/INR - ( 27 Sep 2020 08:57 )   PT: 13.4 sec;   INR: 1.14 ratio         PTT - ( 27 Sep 2020 08:57 )  PTT:27.4 sec        Imaging:

## 2020-09-27 NOTE — PROGRESS NOTE ADULT - SUBJECTIVE AND OBJECTIVE BOX
RED TEAM SURGERY DAILY PROGRESS NOTE:       Subjective: Patient examined at bedside. No acute events overnight.   Pain well managed.  Ambulating appropriately, Voiding.  GI: +/+  Denies CP, SOB, N/V, Dizziness    Objective:    Vital Signs Last 24 Hrs  T(C): 37.1 (27 Sep 2020 04:23), Max: 37.1 (27 Sep 2020 04:23)  T(F): 98.8 (27 Sep 2020 04:23), Max: 98.8 (27 Sep 2020 04:23)  HR: 63 (27 Sep 2020 04:23) (47 - 63)  BP: 125/71 (27 Sep 2020 04:23) (115/68 - 134/73)  BP(mean): --  RR: 18 (27 Sep 2020 04:23) (18 - 18)  SpO2: 97% (27 Sep 2020 04:23) (96% - 97%)    I&O's Detail    26 Sep 2020 07:01  -  27 Sep 2020 07:00  --------------------------------------------------------  IN:    Lactated Ringers: 1200 mL  Total IN: 1200 mL    OUT:  Total OUT: 0 mL    Total NET: 1200 mL          Physical Exam:    General: NAD, well-nourished  HEENT: Atraumatic, EOMI  Resp: Breathing comfortably on RA  CV: Normal sinus rhythm  Abd: soft, mild tenderness, non distended, no guarding, no rigidity  Ext: ROMIx4, motor strength intact x 4      Labaratory Results:                          8.7    6.52  )-----------( 305      ( 27 Sep 2020 06:13 )             28.0     09-27    135  |  98  |  13  ----------------------------<  119<H>  4.3   |  27  |  0.99    Ca    8.5      27 Sep 2020 06:13  Phos  2.4     09-27  Mg     2.1     09-27    TPro  5.0<L>  /  Alb  2.6<L>  /  TBili  0.2  /  DBili  x   /  AST  14  /  ALT  24  /  AlkPhos  36<L>  09-27    PT/INR - ( 26 Sep 2020 09:07 )   PT: 13.1 sec;   INR: 1.11 ratio         PTT - ( 26 Sep 2020 09:07 )  PTT:27.3 sec      Radiology and Additional Tests:    Medications:    MEDICATIONS  (STANDING):  aspirin enteric coated 81 milliGRAM(s) Oral daily  heparin   Injectable 5000 Unit(s) SubCutaneous every 12 hours  hydrocortisone Enema 100 milliGRAM(s) Rectal daily  influenza   Vaccine 0.5 milliLiter(s) IntraMuscular once  lactated ringers. 1000 milliLiter(s) (100 mL/Hr) IV Continuous <Continuous>  melatonin 3 milliGRAM(s) Oral at bedtime  mesalamine Suppository 1000 milliGRAM(s) Rectal at bedtime  methylPREDNISolone sodium succinate Injectable 20 milliGRAM(s) IV Push three times a day  potassium phosphate / sodium phosphate Powder (PHOS-NaK) 1 Packet(s) Oral three times a day with meals    MEDICATIONS  (PRN):  morphine  - Injectable 4 milliGRAM(s) IV Push every 4 hours PRN Severe Pain (7 - 10)  oxyCODONE    IR 5 milliGRAM(s) Oral every 4 hours PRN Moderate Pain (4 - 6)

## 2020-09-27 NOTE — PROGRESS NOTE ADULT - SUBJECTIVE AND OBJECTIVE BOX
Rashel Blake, PGY1  Internal Medicine  Pager #: [phone]    Patient is a 50y old  Male who presents with a chief complaint of abdominal pain (26 Sep 2020 14:38)      SUBJECTIVE / OVERNIGHT EVENTS:  ADDITIONAL REVIEW OF SYSTEMS: 10 point ROS negative except as stated per HPI.    MEDICATIONS  (STANDING):  aspirin enteric coated 81 milliGRAM(s) Oral daily  heparin   Injectable 5000 Unit(s) SubCutaneous every 12 hours  hydrocortisone Enema 100 milliGRAM(s) Rectal daily  influenza   Vaccine 0.5 milliLiter(s) IntraMuscular once  lactated ringers. 1000 milliLiter(s) (100 mL/Hr) IV Continuous <Continuous>  melatonin 3 milliGRAM(s) Oral at bedtime  mesalamine Suppository 1000 milliGRAM(s) Rectal at bedtime  methylPREDNISolone sodium succinate Injectable 20 milliGRAM(s) IV Push three times a day  potassium phosphate / sodium phosphate Powder (PHOS-NaK) 1 Packet(s) Oral three times a day with meals    MEDICATIONS  (PRN):  morphine  - Injectable 4 milliGRAM(s) IV Push every 4 hours PRN Severe Pain (7 - 10)  oxyCODONE    IR 5 milliGRAM(s) Oral every 4 hours PRN Moderate Pain (4 - 6)      CAPILLARY BLOOD GLUCOSE        I&O's Summary    26 Sep 2020 07:01  -  27 Sep 2020 07:00  --------------------------------------------------------  IN: 1200 mL / OUT: 0 mL / NET: 1200 mL        PHYSICAL EXAM:  Vital Signs Last 24 Hrs  T(C): 37.1 (27 Sep 2020 04:23), Max: 37.1 (27 Sep 2020 04:23)  T(F): 98.8 (27 Sep 2020 04:23), Max: 98.8 (27 Sep 2020 04:23)  HR: 63 (27 Sep 2020 04:23) (47 - 63)  BP: 125/71 (27 Sep 2020 04:23) (115/68 - 134/73)  BP(mean): --  RR: 18 (27 Sep 2020 04:23) (18 - 18)  SpO2: 97% (27 Sep 2020 04:23) (96% - 97%)  CONSTITUTIONAL: NAD, lying in bed comfortably  EYES: EOMI, PERRLA; conjunctiva and sclera clear  ENMT: Moist oral mucosa; normal dentition  NECK: Supple, no palpable masses  RESPIRATORY: Lungs clear to ascultation b/l; No rales, ronchi, or wheezing; Unlabored respirations  CARDIOVASCULAR: Regular rate and rhythm, normal S1 and S2, no murmurs, rubs, or gallops  ABDOMEN: Soft, nontender, nondistended, normal bowel sounds  MUSCULOSKELETAL: No joint swelling or tenderness to palpation  PSYCH: Affect appropriate  NEUROLOGY: AAOx3, CNs grossly intact  SKIN: No rashes; no palpable lesions    LABS:                        8.7    6.52  )-----------( 305      ( 27 Sep 2020 06:13 )             28.0     09-27    135  |  98  |  13  ----------------------------<  119<H>  4.3   |  27  |  0.99    Ca    8.5      27 Sep 2020 06:13  Phos  2.4     09-27  Mg     2.1     09-27    TPro  5.0<L>  /  Alb  2.6<L>  /  TBili  0.2  /  DBili  x   /  AST  14  /  ALT  24  /  AlkPhos  36<L>  09-27    PT/INR - ( 26 Sep 2020 09:07 )   PT: 13.1 sec;   INR: 1.11 ratio         PTT - ( 26 Sep 2020 09:07 )  PTT:27.3 sec            RADIOLOGY & ADDITIONAL TESTS: Rashel Blake, PGY1  Internal Medicine  Pager #: [phone]    Patient is a 50y old  Male who presents with a chief complaint of abdominal pain (26 Sep 2020 14:38)      SUBJECTIVE / OVERNIGHT EVENTS: NAEON. Required oxycodone 5mg 2x overnight, another dose this AM with BM. Reports pain is better this morning at 5/10 than yesterday 8/10. BM consistently more brown now and less painful. Also less rectal spams, now only 1-2x/day. Denies SOB, CP, other pain  ADDITIONAL REVIEW OF SYSTEMS: 10 point ROS negative except as stated per HPI.    MEDICATIONS  (STANDING):  aspirin enteric coated 81 milliGRAM(s) Oral daily  heparin   Injectable 5000 Unit(s) SubCutaneous every 12 hours  hydrocortisone Enema 100 milliGRAM(s) Rectal daily  influenza   Vaccine 0.5 milliLiter(s) IntraMuscular once  lactated ringers. 1000 milliLiter(s) (100 mL/Hr) IV Continuous <Continuous>  melatonin 3 milliGRAM(s) Oral at bedtime  mesalamine Suppository 1000 milliGRAM(s) Rectal at bedtime  methylPREDNISolone sodium succinate Injectable 20 milliGRAM(s) IV Push three times a day  potassium phosphate / sodium phosphate Powder (PHOS-NaK) 1 Packet(s) Oral three times a day with meals    MEDICATIONS  (PRN):  morphine  - Injectable 4 milliGRAM(s) IV Push every 4 hours PRN Severe Pain (7 - 10)  oxyCODONE    IR 5 milliGRAM(s) Oral every 4 hours PRN Moderate Pain (4 - 6)      CAPILLARY BLOOD GLUCOSE        I&O's Summary    26 Sep 2020 07:01  -  27 Sep 2020 07:00  --------------------------------------------------------  IN: 1200 mL / OUT: 0 mL / NET: 1200 mL        PHYSICAL EXAM:  Vital Signs Last 24 Hrs  T(C): 37.1 (27 Sep 2020 04:23), Max: 37.1 (27 Sep 2020 04:23)  T(F): 98.8 (27 Sep 2020 04:23), Max: 98.8 (27 Sep 2020 04:23)  HR: 63 (27 Sep 2020 04:23) (47 - 63)  BP: 125/71 (27 Sep 2020 04:23) (115/68 - 134/73)  BP(mean): --  RR: 18 (27 Sep 2020 04:23) (18 - 18)  SpO2: 97% (27 Sep 2020 04:23) (96% - 97%)  CONSTITUTIONAL: NAD, lying in bed comfortably  EYES: EOMI, PERRLA; conjunctiva and sclera clear  ENMT: Moist oral mucosa; normal dentition  NECK: Supple, no palpable masses  RESPIRATORY: Lungs clear to ascultation b/l; No rales, ronchi, or wheezing; Unlabored respirations  CARDIOVASCULAR: Regular rate and rhythm, normal S1 and S2, no murmurs, rubs, or gallops  ABDOMEN: Soft, nondistended, normal bowel sounds; TTP at suprapubic region; no guarding or rebound  MUSCULOSKELETAL: No joint swelling or tenderness to palpation  PSYCH: Affect appropriate  NEUROLOGY: AAOx3, CNs grossly intact  SKIN: No rashes; no palpable lesions    LABS:                        8.7    6.52  )-----------( 305      ( 27 Sep 2020 06:13 )             28.0     09-27    135  |  98  |  13  ----------------------------<  119<H>  4.3   |  27  |  0.99    Ca    8.5      27 Sep 2020 06:13  Phos  2.4     09-27  Mg     2.1     09-27    TPro  5.0<L>  /  Alb  2.6<L>  /  TBili  0.2  /  DBili  x   /  AST  14  /  ALT  24  /  AlkPhos  36<L>  09-27    PT/INR - ( 26 Sep 2020 09:07 )   PT: 13.1 sec;   INR: 1.11 ratio         PTT - ( 26 Sep 2020 09:07 )  PTT:27.3 sec            RADIOLOGY & ADDITIONAL TESTS:

## 2020-09-28 LAB
ALBUMIN SERPL ELPH-MCNC: 2.6 G/DL — LOW (ref 3.3–5)
ALP SERPL-CCNC: 41 U/L — SIGNIFICANT CHANGE UP (ref 40–120)
ALT FLD-CCNC: 29 U/L — SIGNIFICANT CHANGE UP (ref 10–45)
ANION GAP SERPL CALC-SCNC: 9 MMOL/L — SIGNIFICANT CHANGE UP (ref 5–17)
APTT BLD: 26.8 SEC — LOW (ref 27.5–35.5)
AST SERPL-CCNC: 15 U/L — SIGNIFICANT CHANGE UP (ref 10–40)
BASOPHILS # BLD AUTO: 0 K/UL — SIGNIFICANT CHANGE UP (ref 0–0.2)
BASOPHILS NFR BLD AUTO: 0.6 % — SIGNIFICANT CHANGE UP (ref 0–2)
BILIRUB SERPL-MCNC: 0.2 MG/DL — SIGNIFICANT CHANGE UP (ref 0.2–1.2)
BUN SERPL-MCNC: 14 MG/DL — SIGNIFICANT CHANGE UP (ref 7–23)
CALCIUM SERPL-MCNC: 8.5 MG/DL — SIGNIFICANT CHANGE UP (ref 8.4–10.5)
CHLORIDE SERPL-SCNC: 96 MMOL/L — SIGNIFICANT CHANGE UP (ref 96–108)
CO2 SERPL-SCNC: 27 MMOL/L — SIGNIFICANT CHANGE UP (ref 22–31)
CREAT SERPL-MCNC: 0.98 MG/DL — SIGNIFICANT CHANGE UP (ref 0.5–1.3)
CRP SERPL-MCNC: 2.7 MG/DL — HIGH (ref 0–0.4)
EOSINOPHIL # BLD AUTO: 0 K/UL — SIGNIFICANT CHANGE UP (ref 0–0.5)
EOSINOPHIL NFR BLD AUTO: 0 % — SIGNIFICANT CHANGE UP (ref 0–6)
ERYTHROCYTE [SEDIMENTATION RATE] IN BLOOD: 58 MM/HR — HIGH (ref 0–20)
GLUCOSE SERPL-MCNC: 172 MG/DL — HIGH (ref 70–99)
HCT VFR BLD CALC: 28.1 % — LOW (ref 39–50)
HGB BLD-MCNC: 8.7 G/DL — LOW (ref 13–17)
IMM GRANULOCYTES NFR BLD AUTO: 3.5 % — HIGH (ref 0–1.5)
INR BLD: 1.14 RATIO — SIGNIFICANT CHANGE UP (ref 0.88–1.16)
LYMPHOCYTES # BLD AUTO: 0.28 K/UL — LOW (ref 1–3.3)
LYMPHOCYTES # BLD AUTO: 4 % — LOW (ref 13–44)
MAGNESIUM SERPL-MCNC: 2.1 MG/DL — SIGNIFICANT CHANGE UP (ref 1.6–2.6)
MCHC RBC-ENTMCNC: 26.7 PG — LOW (ref 27–34)
MCHC RBC-ENTMCNC: 31 GM/DL — LOW (ref 32–36)
MCV RBC AUTO: 86.2 FL — SIGNIFICANT CHANGE UP (ref 80–100)
MONOCYTES # BLD AUTO: 0.31 K/UL — SIGNIFICANT CHANGE UP (ref 0–0.9)
MONOCYTES NFR BLD AUTO: 4.5 % — SIGNIFICANT CHANGE UP (ref 2–14)
MYELOCYTES NFR BLD: 1.5 % — HIGH (ref 0–0)
NEUTROPHILS # BLD AUTO: 5.35 K/UL — SIGNIFICANT CHANGE UP (ref 1.8–7.4)
NEUTROPHILS NFR BLD AUTO: 76.8 % — SIGNIFICANT CHANGE UP (ref 43–77)
NEUTS BAND # BLD: 12.6 % — HIGH (ref 0–8)
NRBC # BLD: 0 /100 WBCS — SIGNIFICANT CHANGE UP (ref 0–0)
PHOSPHATE SERPL-MCNC: 2.6 MG/DL — SIGNIFICANT CHANGE UP (ref 2.5–4.5)
PLAT MORPH BLD: NORMAL — SIGNIFICANT CHANGE UP
PLATELET # BLD AUTO: 300 K/UL — SIGNIFICANT CHANGE UP (ref 150–400)
POTASSIUM SERPL-MCNC: 4.2 MMOL/L — SIGNIFICANT CHANGE UP (ref 3.5–5.3)
POTASSIUM SERPL-SCNC: 4.2 MMOL/L — SIGNIFICANT CHANGE UP (ref 3.5–5.3)
PROMYELOCYTES # FLD: 0.5 % — HIGH (ref 0–0)
PROT SERPL-MCNC: 4.9 G/DL — LOW (ref 6–8.3)
PROTHROM AB SERPL-ACNC: 13.5 SEC — SIGNIFICANT CHANGE UP (ref 10.6–13.6)
RBC # BLD: 3.26 M/UL — LOW (ref 4.2–5.8)
RBC # FLD: 17.4 % — HIGH (ref 10.3–14.5)
RBC BLD AUTO: SIGNIFICANT CHANGE UP
SODIUM SERPL-SCNC: 132 MMOL/L — LOW (ref 135–145)
WBC # BLD: 6.95 K/UL — SIGNIFICANT CHANGE UP (ref 3.8–10.5)
WBC # FLD AUTO: 6.95 K/UL — SIGNIFICANT CHANGE UP (ref 3.8–10.5)

## 2020-09-28 PROCEDURE — 99232 SBSQ HOSP IP/OBS MODERATE 35: CPT | Mod: GC

## 2020-09-28 PROCEDURE — 99231 SBSQ HOSP IP/OBS SF/LOW 25: CPT

## 2020-09-28 RX ADMIN — Medication 20 MILLIGRAM(S): at 21:27

## 2020-09-28 RX ADMIN — Medication 20 MILLIGRAM(S): at 05:11

## 2020-09-28 RX ADMIN — MORPHINE SULFATE 4 MILLIGRAM(S): 50 CAPSULE, EXTENDED RELEASE ORAL at 17:27

## 2020-09-28 RX ADMIN — OXYCODONE HYDROCHLORIDE 5 MILLIGRAM(S): 5 TABLET ORAL at 21:25

## 2020-09-28 RX ADMIN — OXYCODONE HYDROCHLORIDE 5 MILLIGRAM(S): 5 TABLET ORAL at 05:18

## 2020-09-28 RX ADMIN — PANTOPRAZOLE SODIUM 40 MILLIGRAM(S): 20 TABLET, DELAYED RELEASE ORAL at 05:11

## 2020-09-28 RX ADMIN — OXYCODONE HYDROCHLORIDE 5 MILLIGRAM(S): 5 TABLET ORAL at 12:52

## 2020-09-28 RX ADMIN — OXYCODONE HYDROCHLORIDE 5 MILLIGRAM(S): 5 TABLET ORAL at 21:55

## 2020-09-28 RX ADMIN — Medication 1000 MILLIGRAM(S): at 21:26

## 2020-09-28 RX ADMIN — Medication 3 MILLIGRAM(S): at 21:25

## 2020-09-28 RX ADMIN — Medication 81 MILLIGRAM(S): at 11:30

## 2020-09-28 RX ADMIN — MORPHINE SULFATE 4 MILLIGRAM(S): 50 CAPSULE, EXTENDED RELEASE ORAL at 17:57

## 2020-09-28 RX ADMIN — Medication 100 MILLIGRAM(S): at 05:12

## 2020-09-28 RX ADMIN — Medication 20 MILLIGRAM(S): at 13:00

## 2020-09-28 RX ADMIN — OXYCODONE HYDROCHLORIDE 5 MILLIGRAM(S): 5 TABLET ORAL at 05:48

## 2020-09-28 RX ADMIN — OXYCODONE HYDROCHLORIDE 5 MILLIGRAM(S): 5 TABLET ORAL at 13:22

## 2020-09-28 NOTE — PROGRESS NOTE ADULT - ASSESSMENT
· Assessment	  50 M w UC on Xeljanz p/w severe UC     Problem/Recommendation - 1:  Problem: Ulcerative colitis. Recommendation:Pt feels an improvement today. ESR CRP improved.  -Overall seems improved.   -cortifoam and canasa  -will consider change to PO steroids tomorrow       Problem/Recommendation - 2:  ·  Problem: CAD (coronary artery disease).  Recommendation: no objection to antiplatelets.      Problem/Recommendation - 3:  ·  Problem: Anemia.  Recommendation: due to UC.  DVT prophylaxis should be given despite anemia.     Attending Attestation:   Differential diagnosis and plan of care discussed with patient after the evaluation  35 Minutes spent on total encounter of which more than fifty percent of the encounter was spent counseling and/or coordinating care by the attending physician.        Brandon Gray M.D.   Gastroenterology and Hepatology  Cell: [phone] .

## 2020-09-28 NOTE — PROGRESS NOTE ADULT - PROBLEM SELECTOR PLAN 1
Symptoms fluctuating, though overall seems to be improving on solu-medrol & rectal mesalamine + hydrocortisone. Requiring less morphine now. Per last GI recs, anticipating solu-medrol trial of 5-6 days prior to possible surgery; today is day 5.  - GI following, appreciate recs Symptoms attenuating with less pain and diarrhea, now on day 5 of solu-medrol.  - discontinue IV LR  - may discharge today if cleared per GI and w/ GI recs for post-discharge medical management Symptoms attenuating with less pain and diarrhea, now on day 5 of solu-medrol.  - discontinue IV LR  - per GI, continue one more day on solumedrol

## 2020-09-28 NOTE — PROGRESS NOTE ADULT - SUBJECTIVE AND OBJECTIVE BOX
`SURGERY DAILY PROGRESS NOTE:      S:   Patient seen and examined. No acute events overnight. Pain is well controlled and improving. GI fxn +/+. No longer having diarhea. Tolerating diet w/o N/V      O:   Exam:  Gen: NAD. A&Ox3.  Well developed, alert and cooperative.   Resp: No additional work of breathing.   Card: RR. No peripheral edema or pallor.   Abd: Soft, ND, NT. No rebound or guarding.   Ext: WWP. Able to move all extremities equally.    Vital Signs Last 24 Hrs  T(C): 36.8 (28 Sep 2020 04:46), Max: 37.3 (27 Sep 2020 21:58)  T(F): 98.2 (28 Sep 2020 04:46), Max: 99.1 (27 Sep 2020 21:58)  HR: 63 (28 Sep 2020 04:46) (62 - 64)  BP: 117/67 (28 Sep 2020 04:46) (117/67 - 123/69)  BP(mean): --  RR: 18 (28 Sep 2020 04:46) (16 - 18)  SpO2: 97% (28 Sep 2020 04:46) (96% - 98%)      09-26-20 @ 07:01  -  09-27-20 @ 07:00  --------------------------------------------------------  IN: 1200 mL / OUT: 0 mL / NET: 1200 mL    09-27-20 @ 07:01  -  09-28-20 @ 06:55  --------------------------------------------------------  IN: 3220 mL / OUT: 0 mL / NET: 3220 mL        LABS:                        8.7    6.95  )-----------( 300      ( 28 Sep 2020 06:40 )             28.1     09-27    135  |  98  |  13  ----------------------------<  119<H>  4.3   |  27  |  0.99    Ca    8.5      27 Sep 2020 06:13  Phos  2.4     09-27  Mg     2.1     09-27    TPro  5.0<L>  /  Alb  2.6<L>  /  TBili  0.2  /  DBili  x   /  AST  14  /  ALT  24  /  AlkPhos  36<L>  09-27    PT/INR - ( 27 Sep 2020 08:57 )   PT: 13.4 sec;   INR: 1.14 ratio         PTT - ( 27 Sep 2020 08:57 )  PTT:27.4 sec     `SURGERY DAILY PROGRESS NOTE:      S:   Patient seen and examined. No acute events overnight. Pain is well controlled and improving. GI fxn +/+. No longer having diarhea. Tolerating diet w/o N/V      O:   Exam:  Gen: NAD. A&Ox3.  Well developed, alert and cooperative.   Resp: No additional work of breathing.   Card: RR. No peripheral edema or pallor.   Abd: Soft, mild distention (improved), minimal tenderness in lower quadrant. No rebound or guarding.   Ext: WWP. Able to move all extremities equally.    Vital Signs Last 24 Hrs  T(C): 36.8 (28 Sep 2020 04:46), Max: 37.3 (27 Sep 2020 21:58)  T(F): 98.2 (28 Sep 2020 04:46), Max: 99.1 (27 Sep 2020 21:58)  HR: 63 (28 Sep 2020 04:46) (62 - 64)  BP: 117/67 (28 Sep 2020 04:46) (117/67 - 123/69)  BP(mean): --  RR: 18 (28 Sep 2020 04:46) (16 - 18)  SpO2: 97% (28 Sep 2020 04:46) (96% - 98%)      09-26-20 @ 07:01  -  09-27-20 @ 07:00  --------------------------------------------------------  IN: 1200 mL / OUT: 0 mL / NET: 1200 mL    09-27-20 @ 07:01  -  09-28-20 @ 06:55  --------------------------------------------------------  IN: 3220 mL / OUT: 0 mL / NET: 3220 mL        LABS:                        8.7    6.95  )-----------( 300      ( 28 Sep 2020 06:40 )             28.1     09-27    135  |  98  |  13  ----------------------------<  119<H>  4.3   |  27  |  0.99    Ca    8.5      27 Sep 2020 06:13  Phos  2.4     09-27  Mg     2.1     09-27    TPro  5.0<L>  /  Alb  2.6<L>  /  TBili  0.2  /  DBili  x   /  AST  14  /  ALT  24  /  AlkPhos  36<L>  09-27    PT/INR - ( 27 Sep 2020 08:57 )   PT: 13.4 sec;   INR: 1.14 ratio         PTT - ( 27 Sep 2020 08:57 )  PTT:27.4 sec

## 2020-09-28 NOTE — PROGRESS NOTE ADULT - SUBJECTIVE AND OBJECTIVE BOX
Rashel Blake, PGY1  Internal Medicine  Pager #: [phone]    Patient is a 50y old  Male who presents with a chief complaint of abdominal pain (28 Sep 2020 07:24)      SUBJECTIVE / OVERNIGHT EVENTS: NAEON.  ADDITIONAL REVIEW OF SYSTEMS: 10 point ROS negative except as stated per HPI.    MEDICATIONS  (STANDING):  aspirin enteric coated 81 milliGRAM(s) Oral daily  heparin   Injectable 5000 Unit(s) SubCutaneous every 12 hours  hydrocortisone Enema 100 milliGRAM(s) Rectal daily  influenza   Vaccine 0.5 milliLiter(s) IntraMuscular once  lactated ringers. 1000 milliLiter(s) (100 mL/Hr) IV Continuous <Continuous>  melatonin 3 milliGRAM(s) Oral at bedtime  mesalamine Suppository 1000 milliGRAM(s) Rectal at bedtime  methylPREDNISolone sodium succinate Injectable 20 milliGRAM(s) IV Push three times a day  pantoprazole    Tablet 40 milliGRAM(s) Oral before breakfast    MEDICATIONS  (PRN):  morphine  - Injectable 4 milliGRAM(s) IV Push every 4 hours PRN Severe Pain (7 - 10)  oxyCODONE    IR 5 milliGRAM(s) Oral every 4 hours PRN Moderate Pain (4 - 6)      CAPILLARY BLOOD GLUCOSE        I&O's Summary    27 Sep 2020 07:01  -  28 Sep 2020 07:00  --------------------------------------------------------  IN: 3220 mL / OUT: 0 mL / NET: 3220 mL        PHYSICAL EXAM:  Vital Signs Last 24 Hrs  T(C): 36.8 (28 Sep 2020 04:46), Max: 37.3 (27 Sep 2020 21:58)  T(F): 98.2 (28 Sep 2020 04:46), Max: 99.1 (27 Sep 2020 21:58)  HR: 63 (28 Sep 2020 04:46) (62 - 64)  BP: 117/67 (28 Sep 2020 04:46) (117/67 - 123/69)  BP(mean): --  RR: 18 (28 Sep 2020 04:46) (16 - 18)  SpO2: 97% (28 Sep 2020 04:46) (96% - 98%)  CONSTITUTIONAL: NAD, lying in bed comfortably  EYES: EOMI, PERRLA; conjunctiva and sclera clear  ENMT: Moist oral mucosa; normal dentition  NECK: Supple, no palpable masses  RESPIRATORY: Lungs clear to ascultation b/l; No rales, ronchi, or wheezing; Unlabored respirations  CARDIOVASCULAR: Regular rate and rhythm, normal S1 and S2, no murmurs, rubs, or gallops  ABDOMEN: Soft, nontender, nondistended, normal bowel sounds  MUSCULOSKELETAL: No joint swelling or tenderness to palpation  PSYCH: Affect appropriate  NEUROLOGY: AAOx3, CNs grossly intact  SKIN: No rashes; no palpable lesions    LABS:                        8.7    6.95  )-----------( 300      ( 28 Sep 2020 06:40 )             28.1     09-27    135  |  98  |  13  ----------------------------<  119<H>  4.3   |  27  |  0.99    Ca    8.5      27 Sep 2020 06:13  Phos  2.4     09-27  Mg     2.1     09-27    TPro  5.0<L>  /  Alb  2.6<L>  /  TBili  0.2  /  DBili  x   /  AST  14  /  ALT  24  /  AlkPhos  36<L>  09-27    PT/INR - ( 27 Sep 2020 08:57 )   PT: 13.4 sec;   INR: 1.14 ratio         PTT - ( 27 Sep 2020 08:57 )  PTT:27.4 sec            RADIOLOGY & ADDITIONAL TESTS: Rashel Blake, PGY1  Internal Medicine  Pager #: [phone]    Patient is a 50y old  Male who presents with a chief complaint of abdominal pain (28 Sep 2020 07:24)      SUBJECTIVE / OVERNIGHT EVENTS: NAEON. Required PRN Oxycodone x 2. No BM's overnight. Pt reports pain is 3-4/10 this morning, improved. Reports BMs continue to be brown and are slightly more formed now. Denies SOB, CP, dizziness.  ADDITIONAL REVIEW OF SYSTEMS: 10 point ROS negative except as stated per HPI.    MEDICATIONS  (STANDING):  aspirin enteric coated 81 milliGRAM(s) Oral daily  heparin   Injectable 5000 Unit(s) SubCutaneous every 12 hours  hydrocortisone Enema 100 milliGRAM(s) Rectal daily  influenza   Vaccine 0.5 milliLiter(s) IntraMuscular once  lactated ringers. 1000 milliLiter(s) (100 mL/Hr) IV Continuous <Continuous>  melatonin 3 milliGRAM(s) Oral at bedtime  mesalamine Suppository 1000 milliGRAM(s) Rectal at bedtime  methylPREDNISolone sodium succinate Injectable 20 milliGRAM(s) IV Push three times a day  pantoprazole    Tablet 40 milliGRAM(s) Oral before breakfast    MEDICATIONS  (PRN):  morphine  - Injectable 4 milliGRAM(s) IV Push every 4 hours PRN Severe Pain (7 - 10)  oxyCODONE    IR 5 milliGRAM(s) Oral every 4 hours PRN Moderate Pain (4 - 6)      CAPILLARY BLOOD GLUCOSE        I&O's Summary    27 Sep 2020 07:01  -  28 Sep 2020 07:00  --------------------------------------------------------  IN: 3220 mL / OUT: 0 mL / NET: 3220 mL        PHYSICAL EXAM:  Vital Signs Last 24 Hrs  T(C): 36.8 (28 Sep 2020 04:46), Max: 37.3 (27 Sep 2020 21:58)  T(F): 98.2 (28 Sep 2020 04:46), Max: 99.1 (27 Sep 2020 21:58)  HR: 63 (28 Sep 2020 04:46) (62 - 64)  BP: 117/67 (28 Sep 2020 04:46) (117/67 - 123/69)  BP(mean): --  RR: 18 (28 Sep 2020 04:46) (16 - 18)  SpO2: 97% (28 Sep 2020 04:46) (96% - 98%)  CONSTITUTIONAL: NAD, lying in bed comfortably with hot pack on abdomen  EYES: EOMI, PERRLA; conjunctiva and sclera clear  ENMT: Moist oral mucosa; normal dentition  NECK: Supple, no palpable masses  RESPIRATORY: Lungs clear to ascultation b/l; No rales, ronchi, or wheezing; Unlabored respirations  CARDIOVASCULAR: Regular rate and rhythm, normal S1 and S2, no murmurs, rubs, or gallops  ABDOMEN: Soft, mildly distended, normal bowel sounds, slight TTP  MUSCULOSKELETAL: No joint swelling or tenderness to palpation  PSYCH: Affect appropriate  NEUROLOGY: AAOx3, CNs grossly intact  SKIN: No rashes; no palpable lesions    LABS:               8.7    6.95  )-----------( 300      ( 28 Sep 2020 06:40 )             28.1     09-28    132<L>  |  96  |  14  ----------------------------<  172<H>  4.2   |  27  |  0.98    Ca    8.5      28 Sep 2020 06:45  Phos  2.6     09-28  Mg     2.1     09-28    TPro  4.9<L>  /  Alb  2.6<L>  /  TBili  0.2  /  DBili  x   /  AST  15  /  ALT  29  /  AlkPhos  41  09-28    esrSedimentation Rate, Erythrocyte: 58 mm/hr (09.28.20 @ 09:09)   C-Reactive Protein, Serum (09.28.20 @ 06:45): 2.70 mg/dL     RADIOLOGY & ADDITIONAL TESTS:

## 2020-09-28 NOTE — PROGRESS NOTE ADULT - SUBJECTIVE AND OBJECTIVE BOX
Chief Complaint:  Patient is a 50y old  Male who presents with a chief complaint of abdominal pain (28 Sep 2020 07:24)      Interval Events:   BM frequency decreased  No blood  mostly passing flatus rather than stool    Allergies:  Entyvio (Swelling)  penicillin (Pruritus; Rash)  Remicade (Other)      Hospital Medications:  aspirin enteric coated 81 milliGRAM(s) Oral daily  heparin   Injectable 5000 Unit(s) SubCutaneous every 12 hours  hydrocortisone Enema 100 milliGRAM(s) Rectal daily  influenza   Vaccine 0.5 milliLiter(s) IntraMuscular once  melatonin 3 milliGRAM(s) Oral at bedtime  mesalamine Suppository 1000 milliGRAM(s) Rectal at bedtime  methylPREDNISolone sodium succinate Injectable 20 milliGRAM(s) IV Push three times a day  morphine  - Injectable 4 milliGRAM(s) IV Push every 4 hours PRN  oxyCODONE    IR 5 milliGRAM(s) Oral every 4 hours PRN  pantoprazole    Tablet 40 milliGRAM(s) Oral before breakfast      PMHX/PSHX:  MI (myocardial infarction)    CAD (coronary artery disease)    Ulcerative colitis    S/P knee surgery        Family history:      ROS:     General:  No wt loss, fevers, chills, night sweats, fatigue,   Eyes:  Good vision, no reported pain  ENT:  No sore throat, pain, runny nose, dysphagia  CV:  No pain, palpitations, hypo/hypertension  Resp:  No dyspnea, cough, tachypnea, wheezing  GI:  See HPI  :  No pain, bleeding, incontinence, nocturia  Muscle:  No pain, weakness  Neuro:  No weakness, tingling, memory problems  Psych:  No fatigue, insomnia, mood problems, depression  Endocrine:  No polyuria, polydipsia, cold/heat intolerance  Heme:  No petechiae, ecchymosis, easy bruisability  Skin:  No rash, edema      PHYSICAL EXAM:     GENERAL:  Appears stated age, well-groomed, well-nourished, no distress  HEENT:  NC/AT,  conjunctivae clear, sclera-anicteric  NECK: Trachea midline, supple  CHEST:  Full & symmetric excursion, no increased effort, breath sounds clear  HEART:  Regular rhythm, no manjeet/heave  ABDOMEN:  Soft, mildly-tender, non-distended, normoactive bowel sounds,  no masses ,no hepato-splenomegaly,   EXTREMITIES:  no cyanosis,clubbing or edema  SKIN:  No rash/erythema/petechiae, no jaundice  NEURO:  Alert, oriented, no asterixis  RECTAL: Deferred    Vital Signs:  Vital Signs Last 24 Hrs  T(C): 36.9 (28 Sep 2020 21:20), Max: 36.9 (28 Sep 2020 21:20)  T(F): 98.4 (28 Sep 2020 21:20), Max: 98.4 (28 Sep 2020 21:20)  HR: 69 (28 Sep 2020 21:20) (63 - 69)  BP: 135/75 (28 Sep 2020 21:20) (117/67 - 135/75)  BP(mean): --  RR: 18 (28 Sep 2020 21:20) (18 - 18)  SpO2: 97% (28 Sep 2020 21:20) (97% - 97%)  Daily     Daily     LABS:                        8.7    6.95  )-----------( 300      ( 28 Sep 2020 06:40 )             28.1     09-28    132<L>  |  96  |  14  ----------------------------<  172<H>  4.2   |  27  |  0.98    Ca    8.5      28 Sep 2020 06:45  Phos  2.6     09-28  Mg     2.1     09-28    TPro  4.9<L>  /  Alb  2.6<L>  /  TBili  0.2  /  DBili  x   /  AST  15  /  ALT  29  /  AlkPhos  41  09-28    LIVER FUNCTIONS - ( 28 Sep 2020 06:45 )  Alb: 2.6 g/dL / Pro: 4.9 g/dL / ALK PHOS: 41 U/L / ALT: 29 U/L / AST: 15 U/L / GGT: x           PT/INR - ( 28 Sep 2020 07:52 )   PT: 13.5 sec;   INR: 1.14 ratio         PTT - ( 28 Sep 2020 07:52 )  PTT:26.8 sec        Imaging:

## 2020-09-28 NOTE — PROGRESS NOTE ADULT - ASSESSMENT
50 year old male with pmhx MAGDA x 3 (2014), ulcerative colitis (on Xeljanz and prednisone) who is presenting with abdominal pain 2/2 UC flare, with continued pain and hematochezia. 50 year old male with pmhx MAGDA x 3 (2014), ulcerative colitis (on Xeljanz and prednisone) who is presenting with abdominal pain 2/2 UC flare, with improving pain and hematochezia.

## 2020-09-28 NOTE — PROGRESS NOTE ADULT - ASSESSMENT
50M hx MI s/p MAGDA x3 (2014), ulcerative colitis (on Xeljanz), with lower abdominal pain x 6 weeks presenting with likely UC flare despite tx with multiple biological agents and oral steroids. Now improving on IV steroids.     Plan:  - No urgent surgical intervention indicated at this time  - Patient is improving on IV steroids (day 5 of 6)  - Appreciate GI recommendations--> c/w Xeljanz, improving on IV steroids, no need for additional biologics at this time.   - Should patient remain refractory/unresponsive to 6 days of IV steriods, patient would benefit from proctocolectomy; patient and primary team aware of this possibility.   - Further recommendations and possible surgery pending clinical course, ideally operative intervention would be performed electively, without active inflammation    Randi Low, PGY2  Red Team Surgery  [phone]

## 2020-09-29 LAB
ALBUMIN SERPL ELPH-MCNC: 2.6 G/DL — LOW (ref 3.3–5)
ALP SERPL-CCNC: 43 U/L — SIGNIFICANT CHANGE UP (ref 40–120)
ALT FLD-CCNC: 36 U/L — SIGNIFICANT CHANGE UP (ref 10–45)
ANION GAP SERPL CALC-SCNC: 11 MMOL/L — SIGNIFICANT CHANGE UP (ref 5–17)
APTT BLD: 26.5 SEC — LOW (ref 27.5–35.5)
AST SERPL-CCNC: 15 U/L — SIGNIFICANT CHANGE UP (ref 10–40)
BASOPHILS # BLD AUTO: 0 K/UL — SIGNIFICANT CHANGE UP (ref 0–0.2)
BASOPHILS NFR BLD AUTO: 0 % — SIGNIFICANT CHANGE UP (ref 0–2)
BILIRUB SERPL-MCNC: 0.3 MG/DL — SIGNIFICANT CHANGE UP (ref 0.2–1.2)
BUN SERPL-MCNC: 14 MG/DL — SIGNIFICANT CHANGE UP (ref 7–23)
CALCIUM SERPL-MCNC: 8.5 MG/DL — SIGNIFICANT CHANGE UP (ref 8.4–10.5)
CHLORIDE SERPL-SCNC: 96 MMOL/L — SIGNIFICANT CHANGE UP (ref 96–108)
CO2 SERPL-SCNC: 26 MMOL/L — SIGNIFICANT CHANGE UP (ref 22–31)
CREAT SERPL-MCNC: 0.95 MG/DL — SIGNIFICANT CHANGE UP (ref 0.5–1.3)
EOSINOPHIL # BLD AUTO: 0 K/UL — SIGNIFICANT CHANGE UP (ref 0–0.5)
EOSINOPHIL NFR BLD AUTO: 0 % — SIGNIFICANT CHANGE UP (ref 0–6)
GLUCOSE SERPL-MCNC: 141 MG/DL — HIGH (ref 70–99)
HCT VFR BLD CALC: 30 % — LOW (ref 39–50)
HGB BLD-MCNC: 8.9 G/DL — LOW (ref 13–17)
INR BLD: 1.14 RATIO — SIGNIFICANT CHANGE UP (ref 0.88–1.16)
LYMPHOCYTES # BLD AUTO: 0.92 K/UL — LOW (ref 1–3.3)
LYMPHOCYTES # BLD AUTO: 11.7 % — LOW (ref 13–44)
MAGNESIUM SERPL-MCNC: 2.1 MG/DL — SIGNIFICANT CHANGE UP (ref 1.6–2.6)
MANUAL SMEAR VERIFICATION: SIGNIFICANT CHANGE UP
MCHC RBC-ENTMCNC: 25.9 PG — LOW (ref 27–34)
MCHC RBC-ENTMCNC: 29.7 GM/DL — LOW (ref 32–36)
MCV RBC AUTO: 87.5 FL — SIGNIFICANT CHANGE UP (ref 80–100)
METAMYELOCYTES # FLD: 1.8 % — HIGH (ref 0–0)
MONOCYTES # BLD AUTO: 0.5 K/UL — SIGNIFICANT CHANGE UP (ref 0–0.9)
MONOCYTES NFR BLD AUTO: 6.3 % — SIGNIFICANT CHANGE UP (ref 2–14)
MYELOCYTES NFR BLD: 0.9 % — HIGH (ref 0–0)
NEUTROPHILS # BLD AUTO: 6.25 K/UL — SIGNIFICANT CHANGE UP (ref 1.8–7.4)
NEUTROPHILS NFR BLD AUTO: 74.8 % — SIGNIFICANT CHANGE UP (ref 43–77)
NEUTS BAND # BLD: 4.5 % — SIGNIFICANT CHANGE UP (ref 0–8)
NRBC # BLD: 1 /100 — HIGH (ref 0–0)
PHOSPHATE SERPL-MCNC: 2.4 MG/DL — LOW (ref 2.5–4.5)
PLAT MORPH BLD: NORMAL — SIGNIFICANT CHANGE UP
PLATELET # BLD AUTO: 317 K/UL — SIGNIFICANT CHANGE UP (ref 150–400)
POTASSIUM SERPL-MCNC: 4.1 MMOL/L — SIGNIFICANT CHANGE UP (ref 3.5–5.3)
POTASSIUM SERPL-SCNC: 4.1 MMOL/L — SIGNIFICANT CHANGE UP (ref 3.5–5.3)
PROT SERPL-MCNC: 5.1 G/DL — LOW (ref 6–8.3)
PROTHROM AB SERPL-ACNC: 13.4 SEC — SIGNIFICANT CHANGE UP (ref 10.6–13.6)
RBC # BLD: 3.43 M/UL — LOW (ref 4.2–5.8)
RBC # FLD: 17.6 % — HIGH (ref 10.3–14.5)
RBC BLD AUTO: SIGNIFICANT CHANGE UP
SODIUM SERPL-SCNC: 133 MMOL/L — LOW (ref 135–145)
WBC # BLD: 7.88 K/UL — SIGNIFICANT CHANGE UP (ref 3.8–10.5)
WBC # FLD AUTO: 7.88 K/UL — SIGNIFICANT CHANGE UP (ref 3.8–10.5)

## 2020-09-29 PROCEDURE — 99232 SBSQ HOSP IP/OBS MODERATE 35: CPT | Mod: GC

## 2020-09-29 RX ORDER — SODIUM,POTASSIUM PHOSPHATES 278-250MG
1 POWDER IN PACKET (EA) ORAL THREE TIMES A DAY
Refills: 0 | Status: COMPLETED | OUTPATIENT
Start: 2020-09-29 | End: 2020-09-29

## 2020-09-29 RX ORDER — PHENYLEPHRINE-SHARK LIVER OIL-MINERAL OIL-PETROLATUM RECTAL OINTMENT
1 OINTMENT (GRAM) RECTAL
Refills: 0 | Status: DISCONTINUED | OUTPATIENT
Start: 2020-09-29 | End: 2020-09-30

## 2020-09-29 RX ORDER — PANTOPRAZOLE SODIUM 20 MG/1
1 TABLET, DELAYED RELEASE ORAL
Qty: 30 | Refills: 0
Start: 2020-09-29 | End: 2020-10-28

## 2020-09-29 RX ORDER — SIMETHICONE 80 MG/1
80 TABLET, CHEWABLE ORAL
Refills: 0 | Status: DISCONTINUED | OUTPATIENT
Start: 2020-09-29 | End: 2020-09-30

## 2020-09-29 RX ORDER — HYDROCORTISONE 1 %
1 OINTMENT (GRAM) TOPICAL
Refills: 0 | Status: DISCONTINUED | OUTPATIENT
Start: 2020-09-29 | End: 2020-09-30

## 2020-09-29 RX ADMIN — OXYCODONE HYDROCHLORIDE 5 MILLIGRAM(S): 5 TABLET ORAL at 22:53

## 2020-09-29 RX ADMIN — Medication 1 PACKET(S): at 13:05

## 2020-09-29 RX ADMIN — OXYCODONE HYDROCHLORIDE 5 MILLIGRAM(S): 5 TABLET ORAL at 22:23

## 2020-09-29 RX ADMIN — Medication 100 MILLIGRAM(S): at 05:31

## 2020-09-29 RX ADMIN — Medication 3 MILLIGRAM(S): at 22:18

## 2020-09-29 RX ADMIN — OXYCODONE HYDROCHLORIDE 5 MILLIGRAM(S): 5 TABLET ORAL at 12:29

## 2020-09-29 RX ADMIN — OXYCODONE HYDROCHLORIDE 5 MILLIGRAM(S): 5 TABLET ORAL at 05:36

## 2020-09-29 RX ADMIN — OXYCODONE HYDROCHLORIDE 5 MILLIGRAM(S): 5 TABLET ORAL at 12:59

## 2020-09-29 RX ADMIN — HEPARIN SODIUM 5000 UNIT(S): 5000 INJECTION INTRAVENOUS; SUBCUTANEOUS at 05:30

## 2020-09-29 RX ADMIN — OXYCODONE HYDROCHLORIDE 5 MILLIGRAM(S): 5 TABLET ORAL at 18:21

## 2020-09-29 RX ADMIN — PHENYLEPHRINE-SHARK LIVER OIL-MINERAL OIL-PETROLATUM RECTAL OINTMENT 1 APPLICATION(S): at 22:20

## 2020-09-29 RX ADMIN — PHENYLEPHRINE-SHARK LIVER OIL-MINERAL OIL-PETROLATUM RECTAL OINTMENT 1 APPLICATION(S): at 18:22

## 2020-09-29 RX ADMIN — OXYCODONE HYDROCHLORIDE 5 MILLIGRAM(S): 5 TABLET ORAL at 18:51

## 2020-09-29 RX ADMIN — OXYCODONE HYDROCHLORIDE 5 MILLIGRAM(S): 5 TABLET ORAL at 06:06

## 2020-09-29 RX ADMIN — Medication 1 PACKET(S): at 08:47

## 2020-09-29 RX ADMIN — Medication 1 PACKET(S): at 22:19

## 2020-09-29 RX ADMIN — Medication 20 MILLIGRAM(S): at 05:30

## 2020-09-29 RX ADMIN — Medication 81 MILLIGRAM(S): at 12:29

## 2020-09-29 RX ADMIN — PANTOPRAZOLE SODIUM 40 MILLIGRAM(S): 20 TABLET, DELAYED RELEASE ORAL at 05:30

## 2020-09-29 RX ADMIN — Medication 40 MILLIGRAM(S): at 09:25

## 2020-09-29 NOTE — PROGRESS NOTE ADULT - ASSESSMENT
50 year old male with pmhx MAGDA x 3 (2014), ulcerative colitis (on Xeljanz and prednisone) who is presenting with abdominal pain 2/2 UC flare, with improving pain and hematochezia.

## 2020-09-29 NOTE — PROGRESS NOTE ADULT - SUBJECTIVE AND OBJECTIVE BOX
Chief Complaint:  Patient is a 50y old  Male who presents with a chief complaint of abdominal pain (29 Sep 2020 07:48)      Interval Events:   no BMs overnight  feels overall much improved    Allergies:  Entyvio (Swelling)  penicillin (Pruritus; Rash)  Remicade (Other)      Hospital Medications:  aspirin enteric coated 81 milliGRAM(s) Oral daily  heparin   Injectable 5000 Unit(s) SubCutaneous every 12 hours  hydrocortisone Enema 100 milliGRAM(s) Rectal daily  influenza   Vaccine 0.5 milliLiter(s) IntraMuscular once  melatonin 3 milliGRAM(s) Oral at bedtime  mesalamine Suppository 1000 milliGRAM(s) Rectal at bedtime  morphine  - Injectable 4 milliGRAM(s) IV Push every 4 hours PRN  oxyCODONE    IR 5 milliGRAM(s) Oral every 4 hours PRN  pantoprazole    Tablet 40 milliGRAM(s) Oral before breakfast  potassium phosphate / sodium phosphate Powder (PHOS-NaK) 1 Packet(s) Oral three times a day  predniSONE   Tablet 40 milliGRAM(s) Oral daily      PMHX/PSHX:  MI (myocardial infarction)    CAD (coronary artery disease)    Ulcerative colitis    S/P knee surgery        Family history:      ROS:     General:  No wt loss, fevers, chills, night sweats, fatigue,   Eyes:  Good vision, no reported pain  ENT:  No sore throat, pain, runny nose, dysphagia  CV:  No pain, palpitations, hypo/hypertension  Resp:  No dyspnea, cough, tachypnea, wheezing  GI:  See HPI  :  No pain, bleeding, incontinence, nocturia  Muscle:  No pain, weakness  Neuro:  No weakness, tingling, memory problems  Psych:  No fatigue, insomnia, mood problems, depression  Endocrine:  No polyuria, polydipsia, cold/heat intolerance  Heme:  No petechiae, ecchymosis, easy bruisability  Skin:  No rash, edema      PHYSICAL EXAM:     GENERAL:  Appears stated age, well-groomed, well-nourished, no distress  HEENT:  NC/AT,  conjunctivae clear, sclera-anicteric  NECK: Trachea midline, supple  CHEST:  Full & symmetric excursion, no increased effort, breath sounds clear  HEART:  Regular rhythm, no manjeet/heave  ABDOMEN:  Soft, non-tender, non-distended, normoactive bowel sounds,  no masses ,no hepato-splenomegaly,   EXTREMITIES:  no cyanosis,clubbing or edema  SKIN:  No rash/erythema/petechiae, no jaundice  NEURO:  Alert, oriented, no asterixis  RECTAL: Deferred    Vital Signs:  Vital Signs Last 24 Hrs  T(C): 36.8 (29 Sep 2020 05:05), Max: 36.9 (28 Sep 2020 21:20)  T(F): 98.2 (29 Sep 2020 05:05), Max: 98.4 (28 Sep 2020 21:20)  HR: 65 (29 Sep 2020 05:05) (64 - 69)  BP: 124/73 (29 Sep 2020 05:05) (118/64 - 135/75)  BP(mean): --  RR: 18 (29 Sep 2020 05:05) (18 - 18)  SpO2: 98% (29 Sep 2020 05:05) (97% - 98%)  Daily     Daily     LABS:                        8.9    7.88  )-----------( 317      ( 29 Sep 2020 07:01 )             30.0     09-29    133<L>  |  96  |  14  ----------------------------<  141<H>  4.1   |  26  |  0.95    Ca    8.5      29 Sep 2020 07:01  Phos  2.4     09-29  Mg     2.1     09-29    TPro  5.1<L>  /  Alb  2.6<L>  /  TBili  0.3  /  DBili  x   /  AST  15  /  ALT  36  /  AlkPhos  43  09-29    LIVER FUNCTIONS - ( 29 Sep 2020 07:01 )  Alb: 2.6 g/dL / Pro: 5.1 g/dL / ALK PHOS: 43 U/L / ALT: 36 U/L / AST: 15 U/L / GGT: x           PT/INR - ( 29 Sep 2020 07:51 )   PT: 13.4 sec;   INR: 1.14 ratio         PTT - ( 29 Sep 2020 07:51 )  PTT:26.5 sec        Imaging:

## 2020-09-29 NOTE — CHART NOTE - TREATMENT: THE FOLLOWING DIET HAS BEEN RECOMMENDED
Diet, Low Fiber:   Lactose Restricted (Milk Sugar Intoler.)  Supplement Feeding Modality:  Oral  Ensure Clear Cans or Servings Per Day:  3       Frequency:  Daily (09-29-20 @ 14:03) [Active]

## 2020-09-29 NOTE — DIETITIAN INITIAL EVALUATION ADULT. - REASON INDICATOR FOR ASSESSMENT
Pt seen for LOS.   Source: pt     Pt admitted c abdominal pain x 6 weeks, UC flare up, being followed by GI and Surgery, now on Prednisone.

## 2020-09-29 NOTE — DIETITIAN INITIAL EVALUATION ADULT. - PROBLEM SELECTOR PLAN 1
Dx UC 2010. Recently admitted to [facility] for management of UC symptoms. Colonoscopy showed inflammation throughout 2/3 of his colon. No surgical intervention at that time. Discharged on Xelijanz and prednisone 40mg qdaily. Now admitted for continued abdominal pain and diarrhea. Failed multiple biologic agents.  -s/p cipro and flagyl in the ED  -CT abdomen long segment continuous left-sided colitis from the transverse colon to the rectum with pericolonic inflammatory changes. Air distended transverse colon. Correlate clinically for toxic megacolon.  -seen by colorectal surgery, appreciate recs  -no urgent surgical intervention at this time  -would strongly consider elective proctocolectomy when inflammation has reduced  -GI consult with Cesar Fry [phone]  -pain control with IV morphine   -c/w prednisone 40mg qdaily  -c/w dicyclomine  -c/w cipro/flagyl  -check c. diff   -IVF maintenance  -Continuation of home xeljanz as per GI, pt has meds with him.

## 2020-09-29 NOTE — PROGRESS NOTE ADULT - SUBJECTIVE AND OBJECTIVE BOX
`SURGERY DAILY PROGRESS NOTE:      S:   Patient seen and examined. No acute events overnight. Pain is well controlled. GI fxn +/+. Tolerating diet w/o N/V      O:   Exam:  Gen: NAD. A&Ox3.  Well developed, alert and cooperative.   Resp: No additional work of breathing.   Card: RR. No peripheral edema or pallor.   Abd: Soft, ND, NT. No rebound or guarding.   Ext: WWP. Able to move all extremities equally.    Vital Signs Last 24 Hrs  T(C): 36.8 (29 Sep 2020 05:05), Max: 36.9 (28 Sep 2020 21:20)  T(F): 98.2 (29 Sep 2020 05:05), Max: 98.4 (28 Sep 2020 21:20)  HR: 65 (29 Sep 2020 05:05) (64 - 69)  BP: 124/73 (29 Sep 2020 05:05) (118/64 - 135/75)  BP(mean): --  RR: 18 (29 Sep 2020 05:05) (18 - 18)  SpO2: 98% (29 Sep 2020 05:05) (97% - 98%)      09-27-20 @ 07:01  -  09-28-20 @ 07:00  --------------------------------------------------------  IN: 3220 mL / OUT: 0 mL / NET: 3220 mL    09-28-20 @ 07:01  -  09-29-20 @ 06:37  --------------------------------------------------------  IN: 1000 mL / OUT: 0 mL / NET: 1000 mL        LABS:                        8.7    6.95  )-----------( 300      ( 28 Sep 2020 06:40 )             28.1     09-28    132<L>  |  96  |  14  ----------------------------<  172<H>  4.2   |  27  |  0.98    Ca    8.5      28 Sep 2020 06:45  Phos  2.6     09-28  Mg     2.1     09-28    TPro  4.9<L>  /  Alb  2.6<L>  /  TBili  0.2  /  DBili  x   /  AST  15  /  ALT  29  /  AlkPhos  41  09-28    PT/INR - ( 28 Sep 2020 07:52 )   PT: 13.5 sec;   INR: 1.14 ratio         PTT - ( 28 Sep 2020 07:52 )  PTT:26.8 sec

## 2020-09-29 NOTE — DIETITIAN INITIAL EVALUATION ADULT. - OTHER INFO
Pt reports overall he had periods of poor appetite and intake followed by good intake followed by another period of poor PO intake PTA. States when he was eating well he was eating everything and would avoid dairy products but otherwise was eating a regular diet.     Pt reports NKFA. States he was taking folic acid, vitamin B12, vitamin D, vitamin C and probiotics at home.     Pt reports he weighed about 208 pounds at the beginning of August 2020 and then at the beginning of this month he was about 178 pounds at [facility]. Noted admit wt of 166.4 pounds, pt unsure of he has truly lost wt during the last month but does feel his clothing was looser. Pt agreeable to nutrition focused physical exam, reports acromion process and collar bone have always been prominent, pt c mild muscle loss around temples, shoulders, clavicles and around calf muscles, and moderate fat loss around orbital, buccal and tricep region.     Pt reports he tried Ensure Clear during outside hospitalization and did like it and is willing to take it in house. Pt also agreeable to taking double portions of protein c some meals.

## 2020-09-29 NOTE — DIETITIAN INITIAL EVALUATION ADULT. - ADD RECOMMEND
1. Recommend Ensure Clear x 3 daily. 2. Encouraged PO intake-small, frequent meals and nutrient dense snacks. In house, encouraged pt to order nutrient dense snacks c meals to consume in between meals to mimic small, frequent meals. Discussed protein rich foods available on menu. Discussed consuming protein first at meal times and then eating the other foods. Reviewed low fiber diet and binding foods.

## 2020-09-29 NOTE — PROVIDER CONTACT NOTE (MEDICATION) - ASSESSMENT
Pt refuses mesalamine suppository due to swollen hemorrhoids causing bleeding and pain upon insertion. Pt is willing to take different form of medication

## 2020-09-29 NOTE — PROGRESS NOTE ADULT - ASSESSMENT
· Assessment	  50 M w UC on Xeljanz p/w severe UC     Problem/Recommendation - 1:  Problem: Ulcerative colitis. Recommendation:Pt feels an improvement today. ESR CRP improved.  -Overall seems improved.   -cortifoam and canasa  -I have discontinued IV steroids in favor of prednisone. Plan is to observe until tomorrow and potentially d/c on long prednisone taper. Will notify regarding whether Xeljanz should be resumed on d/c.       Problem/Recommendation - 2:  ·  Problem: CAD (coronary artery disease).  Recommendation: no objection to antiplatelets.      Problem/Recommendation - 3:  ·  Problem: Anemia.  Recommendation: due to UC.  DVT prophylaxis should be given despite anemia.     Attending Attestation:   Differential diagnosis and plan of care discussed with patient after the evaluation  35 Minutes spent on total encounter of which more than fifty percent of the encounter was spent counseling and/or coordinating care by the attending physician.        Brandon Gray M.D.   Gastroenterology and Hepatology  Cell: [phone] .

## 2020-09-29 NOTE — PROGRESS NOTE ADULT - PROBLEM SELECTOR PLAN 4
Transitions of Care Status:  1.  Name of PCP:  2.  PCP Contacted on Admission: [ ] Y    [ ] N    3.  PCP contacted at Discharge: [ ] Y    [ ] N    [ ] N/A  4.  Post-Discharge Appointment Date and Location:  5.  Summary of Handoff given to PCP: Transitions of Care Status:  1.  Name of PCP:  2.  PCP Contacted on Admission: [ ] Y    [ ] N    3.  PCP contacted at Discharge: [ ] Y    [ ] N    [ ] N/A  4.  Post-Discharge Appointment Date and Location: d/c likely tomorrow to home  5.  Summary of Handoff given to PCP: Transitions of Care Status:  1.  Name of PCP: Veto Todd  2.  PCP Contacted on Admission: [ ] Y    [x] N    3.  PCP contacted at Discharge: [ ] Y    [ ] N    [ ] N/A  4.  Post-Discharge Appointment Date and Location: d/c likely tomorrow to home  5.  Summary of Handoff given to PCP:

## 2020-09-29 NOTE — DIETITIAN INITIAL EVALUATION ADULT. - FACTORS AFF FOOD INTAKE
pt reports good appetite and intake in house, reports he has been eating % of his meals; denies any chewing/swallowing issues; reports BMs continue to be very loose but less frequent than on admission

## 2020-09-29 NOTE — PROVIDER CONTACT NOTE (MEDICATION) - RECOMMENDATIONS
Dr. Carvajal aware. Patient is to be discharged tomorrow and is aware of risk factors of not taking heparin shot in hospital. Will continue to monitor.

## 2020-09-29 NOTE — PROGRESS NOTE ADULT - SUBJECTIVE AND OBJECTIVE BOX
Rashel Blake, PGY1  Internal Medicine  Pager #: [phone]    Patient is a 50y old  Male who presents with a chief complaint of abdominal pain (29 Sep 2020 06:37)      SUBJECTIVE / OVERNIGHT EVENTS: NAEON. Required PRN Oxycodone x2.  ADDITIONAL REVIEW OF SYSTEMS: 10 point ROS negative except as stated per HPI.    MEDICATIONS  (STANDING):  aspirin enteric coated 81 milliGRAM(s) Oral daily  heparin   Injectable 5000 Unit(s) SubCutaneous every 12 hours  hydrocortisone Enema 100 milliGRAM(s) Rectal daily  influenza   Vaccine 0.5 milliLiter(s) IntraMuscular once  melatonin 3 milliGRAM(s) Oral at bedtime  mesalamine Suppository 1000 milliGRAM(s) Rectal at bedtime  methylPREDNISolone sodium succinate Injectable 20 milliGRAM(s) IV Push three times a day  pantoprazole    Tablet 40 milliGRAM(s) Oral before breakfast    MEDICATIONS  (PRN):  morphine  - Injectable 4 milliGRAM(s) IV Push every 4 hours PRN Severe Pain (7 - 10)  oxyCODONE    IR 5 milliGRAM(s) Oral every 4 hours PRN Moderate Pain (4 - 6)      CAPILLARY BLOOD GLUCOSE        I&O's Summary    28 Sep 2020 07:01  -  29 Sep 2020 07:00  --------------------------------------------------------  IN: 1000 mL / OUT: 0 mL / NET: 1000 mL        PHYSICAL EXAM:  Vital Signs Last 24 Hrs  T(C): 36.8 (29 Sep 2020 05:05), Max: 36.9 (28 Sep 2020 21:20)  T(F): 98.2 (29 Sep 2020 05:05), Max: 98.4 (28 Sep 2020 21:20)  HR: 65 (29 Sep 2020 05:05) (64 - 69)  BP: 124/73 (29 Sep 2020 05:05) (118/64 - 135/75)  BP(mean): --  RR: 18 (29 Sep 2020 05:05) (18 - 18)  SpO2: 98% (29 Sep 2020 05:05) (97% - 98%)  CONSTITUTIONAL: NAD, lying in bed comfortably  EYES: EOMI, PERRLA; conjunctiva and sclera clear  ENMT: Moist oral mucosa; normal dentition  NECK: Supple, no palpable masses  RESPIRATORY: Lungs clear to ascultation b/l; No rales, ronchi, or wheezing; Unlabored respirations  CARDIOVASCULAR: Regular rate and rhythm, normal S1 and S2, no murmurs, rubs, or gallops  ABDOMEN: Soft, nontender, nondistended, normal bowel sounds  MUSCULOSKELETAL: No joint swelling or tenderness to palpation  PSYCH: Affect appropriate  NEUROLOGY: AAOx3, CNs grossly intact  SKIN: No rashes; no palpable lesions    LABS:                        8.9    7.88  )-----------( 317      ( 29 Sep 2020 07:01 )             30.0     09-29    133<L>  |  96  |  14  ----------------------------<  141<H>  4.1   |  26  |  0.95    Ca    8.5      29 Sep 2020 07:01  Phos  2.4     09-29  Mg     2.1     09-29    TPro  5.1<L>  /  Alb  2.6<L>  /  TBili  0.3  /  DBili  x   /  AST  15  /  ALT  36  /  AlkPhos  43  09-29    PT/INR - ( 28 Sep 2020 07:52 )   PT: 13.5 sec;   INR: 1.14 ratio         PTT - ( 28 Sep 2020 07:52 )  PTT:26.8 sec            RADIOLOGY & ADDITIONAL TESTS: Rashel Blake, PGY1  Internal Medicine  Pager #: [phone]    Patient is a 50y old  Male who presents with a chief complaint of abdominal pain (29 Sep 2020 06:37)      SUBJECTIVE / OVERNIGHT EVENTS: NAEON. Required PRN Oxycodone x2. Reports no BM overnight. BM this morning, brown, diarrheal, with pain 5/10. Pain this AM is 3/10.  ADDITIONAL REVIEW OF SYSTEMS: 10 point ROS negative except as stated per HPI.    MEDICATIONS  (STANDING):  aspirin enteric coated 81 milliGRAM(s) Oral daily  heparin   Injectable 5000 Unit(s) SubCutaneous every 12 hours  hydrocortisone Enema 100 milliGRAM(s) Rectal daily  influenza   Vaccine 0.5 milliLiter(s) IntraMuscular once  melatonin 3 milliGRAM(s) Oral at bedtime  mesalamine Suppository 1000 milliGRAM(s) Rectal at bedtime  methylPREDNISolone sodium succinate Injectable 20 milliGRAM(s) IV Push three times a day  pantoprazole    Tablet 40 milliGRAM(s) Oral before breakfast    MEDICATIONS  (PRN):  morphine  - Injectable 4 milliGRAM(s) IV Push every 4 hours PRN Severe Pain (7 - 10)  oxyCODONE    IR 5 milliGRAM(s) Oral every 4 hours PRN Moderate Pain (4 - 6)      CAPILLARY BLOOD GLUCOSE        I&O's Summary    28 Sep 2020 07:01  -  29 Sep 2020 07:00  --------------------------------------------------------  IN: 1000 mL / OUT: 0 mL / NET: 1000 mL        PHYSICAL EXAM:  Vital Signs Last 24 Hrs  T(C): 36.8 (29 Sep 2020 05:05), Max: 36.9 (28 Sep 2020 21:20)  T(F): 98.2 (29 Sep 2020 05:05), Max: 98.4 (28 Sep 2020 21:20)  HR: 65 (29 Sep 2020 05:05) (64 - 69)  BP: 124/73 (29 Sep 2020 05:05) (118/64 - 135/75)  BP(mean): --  RR: 18 (29 Sep 2020 05:05) (18 - 18)  SpO2: 98% (29 Sep 2020 05:05) (97% - 98%)  CONSTITUTIONAL: NAD, lying in bed comfortably  EYES: EOMI, PERRLA; conjunctiva and sclera clear  ENMT: Moist oral mucosa; normal dentition  NECK: Supple, no palpable masses  RESPIRATORY: Lungs clear to ascultation b/l; No rales, ronchi, or wheezing; Unlabored respirations  CARDIOVASCULAR: Regular rate and rhythm, normal S1 and S2, no murmurs, rubs, or gallops  ABDOMEN: Soft, mild TTP in lower abdomen, mildly distended, normal bowel sounds  MUSCULOSKELETAL: No joint swelling or tenderness to palpation  PSYCH: Affect appropriate  NEUROLOGY: AAOx3, CNs grossly intact  SKIN: No rashes; no palpable lesions    LABS:                        8.9    7.88  )-----------( 317      ( 29 Sep 2020 07:01 )             30.0     09-29    133<L>  |  96  |  14  ----------------------------<  141<H>  4.1   |  26  |  0.95    Ca    8.5      29 Sep 2020 07:01  Phos  2.4     09-29  Mg     2.1     09-29    TPro  5.1<L>  /  Alb  2.6<L>  /  TBili  0.3  /  DBili  x   /  AST  15  /  ALT  36  /  AlkPhos  43  09-29    PT/INR - ( 28 Sep 2020 07:52 )   PT: 13.5 sec;   INR: 1.14 ratio         PTT - ( 28 Sep 2020 07:52 )  PTT:26.8 sec            RADIOLOGY & ADDITIONAL TESTS:

## 2020-09-29 NOTE — PROVIDER CONTACT NOTE (MEDICATION) - SITUATION
Patient refusing sq heparin shot. No s/s of distress. 50
Pt refuses mesalamine suppository due to swollen hemorrhoids causing bleeding and pain upon insertion. Pt is willing to take different form of medication
60yo male, CONTRAST DYE ALLERGY (HIVES), Former smoker with PMHx of HTN, DM II, CAD s/p 3vCABG at [facility] in 2008 (LIMA-LAD, SVG-OM, SVG-PDA) who presented to [facility] ED 4/2018 with chest pain s/p Cardiac Cath 4/17/18 receiving a RAJENDRA to LM-OM1, w/ residual pRCA 80% and mRCA 90% and recommended for staged PCI if clinically indicated.  Since procedure patient states he still has intermittent episodes of chest pain described as "twinges" that is worsened with sneezing. Pt denies FALK, palpitations, syncope, PND/orthopnea or LE edema. s/p Cardiac Cath 6/11/18 with rotoblator/RAJENDRA mRCA and RAJENDRA pRCA. RFA perclosed. TVP placed via RFV which was removed during case after intervention. R groin stable and VSS and EKG labs reviewed. Pt is stable for d/c home with current meds and f/u with Dr. Garcia in 1-2 weeks.

## 2020-09-29 NOTE — PROVIDER CONTACT NOTE (MEDICATION) - BACKGROUND
Pt admitted for UC flare; plan is to observe pt until 9/30/20 and potentially d/c pt on long prednisone taper

## 2020-09-29 NOTE — DIETITIAN INITIAL EVALUATION ADULT. - PERTINENT MEDS FT
MEDICATIONS  (STANDING):  aspirin enteric coated 81 milliGRAM(s) Oral daily  heparin   Injectable 5000 Unit(s) SubCutaneous every 12 hours  hydrocortisone Enema 100 milliGRAM(s) Rectal daily  influenza   Vaccine 0.5 milliLiter(s) IntraMuscular once  melatonin 3 milliGRAM(s) Oral at bedtime  mesalamine Suppository 1000 milliGRAM(s) Rectal at bedtime  pantoprazole    Tablet 40 milliGRAM(s) Oral before breakfast  potassium phosphate / sodium phosphate Powder (PHOS-NaK) 1 Packet(s) Oral three times a day  predniSONE   Tablet 40 milliGRAM(s) Oral daily    MEDICATIONS  (PRN):  hemorrhoidal Ointment 1 Application(s) Rectal two times a day PRN hemorrhoid  hydrocortisone hemorrhoidal Suppository 1 Suppository(s) Rectal two times a day PRN hemorrhoid  morphine  - Injectable 4 milliGRAM(s) IV Push every 4 hours PRN Severe Pain (7 - 10)  oxyCODONE    IR 5 milliGRAM(s) Oral every 4 hours PRN Moderate Pain (4 - 6)  simethicone 80 milliGRAM(s) Chew two times a day PRN Gas

## 2020-09-29 NOTE — PROGRESS NOTE ADULT - PROBLEM SELECTOR PLAN 1
Symptoms attenuating with less pain and diarrhea, now on day 5 of solu-medrol.  - discontinue IV LR  - per GI, continue one more day on solumedrol Symptoms continue to attenuate. Per GI, observe one more day with transition from IV to oral steroid.  - discontinued solu-medrol  - start prednisone 40mg PO daily

## 2020-09-29 NOTE — PROGRESS NOTE ADULT - ASSESSMENT
50M hx MI s/p MAGDA x3 (2014), ulcerative colitis (on Xeljanz), with lower abdominal pain x 6 weeks presenting with likely UC flare despite tx with multiple biological agents and oral steroids. Now improving on IV steroids.     Plan:  - No urgent surgical intervention indicated at this time  - Patient is improving on IV steroids (day 6 of 6)--> will transition to oral steriods per IV  - Appreciate GI recommendations--> c/w Xeljanz, improving on IV steroids, will transition to oral; no need for additional biologics at this time  - Patient responded adequately to IV steroids, no indication for proctocolectomy on this hospital course  - Operative intervention, proctocolectomy, will be performed electively, without active inflammation. Patient can follow-up outpatient for scheduling and management    Randi Low, PGY2  Red Team Surgery  [phone]

## 2020-09-30 VITALS
HEART RATE: 76 BPM | RESPIRATION RATE: 18 BRPM | DIASTOLIC BLOOD PRESSURE: 72 MMHG | TEMPERATURE: 98 F | OXYGEN SATURATION: 99 % | SYSTOLIC BLOOD PRESSURE: 121 MMHG

## 2020-09-30 LAB
ALBUMIN SERPL ELPH-MCNC: 2.7 G/DL — LOW (ref 3.3–5)
ALP SERPL-CCNC: 43 U/L — SIGNIFICANT CHANGE UP (ref 40–120)
ALT FLD-CCNC: 52 U/L — HIGH (ref 10–45)
ANION GAP SERPL CALC-SCNC: 9 MMOL/L — SIGNIFICANT CHANGE UP (ref 5–17)
APTT BLD: 27.4 SEC — LOW (ref 27.5–35.5)
AST SERPL-CCNC: 17 U/L — SIGNIFICANT CHANGE UP (ref 10–40)
BASOPHILS # BLD AUTO: 0 K/UL — SIGNIFICANT CHANGE UP (ref 0–0.2)
BASOPHILS NFR BLD AUTO: 0 % — SIGNIFICANT CHANGE UP (ref 0–2)
BILIRUB SERPL-MCNC: 0.3 MG/DL — SIGNIFICANT CHANGE UP (ref 0.2–1.2)
BUN SERPL-MCNC: 17 MG/DL — SIGNIFICANT CHANGE UP (ref 7–23)
CALCIUM SERPL-MCNC: 8.6 MG/DL — SIGNIFICANT CHANGE UP (ref 8.4–10.5)
CHLORIDE SERPL-SCNC: 97 MMOL/L — SIGNIFICANT CHANGE UP (ref 96–108)
CO2 SERPL-SCNC: 27 MMOL/L — SIGNIFICANT CHANGE UP (ref 22–31)
CREAT SERPL-MCNC: 1.01 MG/DL — SIGNIFICANT CHANGE UP (ref 0.5–1.3)
EOSINOPHIL # BLD AUTO: 0.17 K/UL — SIGNIFICANT CHANGE UP (ref 0–0.5)
EOSINOPHIL NFR BLD AUTO: 2.6 % — SIGNIFICANT CHANGE UP (ref 0–6)
GLUCOSE SERPL-MCNC: 83 MG/DL — SIGNIFICANT CHANGE UP (ref 70–99)
HCT VFR BLD CALC: 34.1 % — LOW (ref 39–50)
HGB BLD-MCNC: 10.1 G/DL — LOW (ref 13–17)
INR BLD: 1.12 RATIO — SIGNIFICANT CHANGE UP (ref 0.88–1.16)
LYMPHOCYTES # BLD AUTO: 1.1 K/UL — SIGNIFICANT CHANGE UP (ref 1–3.3)
LYMPHOCYTES # BLD AUTO: 16.7 % — SIGNIFICANT CHANGE UP (ref 13–44)
MAGNESIUM SERPL-MCNC: 2.2 MG/DL — SIGNIFICANT CHANGE UP (ref 1.6–2.6)
MANUAL SMEAR VERIFICATION: SIGNIFICANT CHANGE UP
MCHC RBC-ENTMCNC: 26.2 PG — LOW (ref 27–34)
MCHC RBC-ENTMCNC: 29.6 GM/DL — LOW (ref 32–36)
MCV RBC AUTO: 88.3 FL — SIGNIFICANT CHANGE UP (ref 80–100)
METAMYELOCYTES # FLD: 1.8 % — HIGH (ref 0–0)
MONOCYTES # BLD AUTO: 0.75 K/UL — SIGNIFICANT CHANGE UP (ref 0–0.9)
MONOCYTES NFR BLD AUTO: 11.4 % — SIGNIFICANT CHANGE UP (ref 2–14)
MYELOCYTES NFR BLD: 2.6 % — HIGH (ref 0–0)
NEUTROPHILS # BLD AUTO: 4.27 K/UL — SIGNIFICANT CHANGE UP (ref 1.8–7.4)
NEUTROPHILS NFR BLD AUTO: 52.6 % — SIGNIFICANT CHANGE UP (ref 43–77)
NEUTS BAND # BLD: 12.3 % — HIGH (ref 0–8)
PHOSPHATE SERPL-MCNC: 1.5 MG/DL — LOW (ref 2.5–4.5)
PLAT MORPH BLD: NORMAL — SIGNIFICANT CHANGE UP
PLATELET # BLD AUTO: 301 K/UL — SIGNIFICANT CHANGE UP (ref 150–400)
POTASSIUM SERPL-MCNC: 4 MMOL/L — SIGNIFICANT CHANGE UP (ref 3.5–5.3)
POTASSIUM SERPL-SCNC: 4 MMOL/L — SIGNIFICANT CHANGE UP (ref 3.5–5.3)
PROT SERPL-MCNC: 5.4 G/DL — LOW (ref 6–8.3)
PROTHROM AB SERPL-ACNC: 13.1 SEC — SIGNIFICANT CHANGE UP (ref 10.6–13.6)
RBC # BLD: 3.86 M/UL — LOW (ref 4.2–5.8)
RBC # FLD: 17.5 % — HIGH (ref 10.3–14.5)
RBC BLD AUTO: SIGNIFICANT CHANGE UP
SODIUM SERPL-SCNC: 133 MMOL/L — LOW (ref 135–145)
WBC # BLD: 6.58 K/UL — SIGNIFICANT CHANGE UP (ref 3.8–10.5)
WBC # FLD AUTO: 6.58 K/UL — SIGNIFICANT CHANGE UP (ref 3.8–10.5)

## 2020-09-30 PROCEDURE — 87507 IADNA-DNA/RNA PROBE TQ 12-25: CPT

## 2020-09-30 PROCEDURE — 96375 TX/PRO/DX INJ NEW DRUG ADDON: CPT

## 2020-09-30 PROCEDURE — 86704 HEP B CORE ANTIBODY TOTAL: CPT

## 2020-09-30 PROCEDURE — 82803 BLOOD GASES ANY COMBINATION: CPT

## 2020-09-30 PROCEDURE — 83690 ASSAY OF LIPASE: CPT

## 2020-09-30 PROCEDURE — 80048 BASIC METABOLIC PNL TOTAL CA: CPT

## 2020-09-30 PROCEDURE — 84132 ASSAY OF SERUM POTASSIUM: CPT

## 2020-09-30 PROCEDURE — 85018 HEMOGLOBIN: CPT

## 2020-09-30 PROCEDURE — 87340 HEPATITIS B SURFACE AG IA: CPT

## 2020-09-30 PROCEDURE — 99285 EMERGENCY DEPT VISIT HI MDM: CPT | Mod: 25

## 2020-09-30 PROCEDURE — 85025 COMPLETE CBC W/AUTO DIFF WBC: CPT

## 2020-09-30 PROCEDURE — 93005 ELECTROCARDIOGRAM TRACING: CPT

## 2020-09-30 PROCEDURE — 74177 CT ABD & PELVIS W/CONTRAST: CPT

## 2020-09-30 PROCEDURE — 85730 THROMBOPLASTIN TIME PARTIAL: CPT

## 2020-09-30 PROCEDURE — 85610 PROTHROMBIN TIME: CPT

## 2020-09-30 PROCEDURE — 82435 ASSAY OF BLOOD CHLORIDE: CPT

## 2020-09-30 PROCEDURE — 82947 ASSAY GLUCOSE BLOOD QUANT: CPT

## 2020-09-30 PROCEDURE — 87324 CLOSTRIDIUM AG IA: CPT

## 2020-09-30 PROCEDURE — 87449 NOS EACH ORGANISM AG IA: CPT

## 2020-09-30 PROCEDURE — 86769 SARS-COV-2 COVID-19 ANTIBODY: CPT

## 2020-09-30 PROCEDURE — 99231 SBSQ HOSP IP/OBS SF/LOW 25: CPT

## 2020-09-30 PROCEDURE — 83605 ASSAY OF LACTIC ACID: CPT

## 2020-09-30 PROCEDURE — 82962 GLUCOSE BLOOD TEST: CPT

## 2020-09-30 PROCEDURE — 85652 RBC SED RATE AUTOMATED: CPT

## 2020-09-30 PROCEDURE — 96376 TX/PRO/DX INJ SAME DRUG ADON: CPT

## 2020-09-30 PROCEDURE — 85014 HEMATOCRIT: CPT

## 2020-09-30 PROCEDURE — 80053 COMPREHEN METABOLIC PANEL: CPT

## 2020-09-30 PROCEDURE — 99239 HOSP IP/OBS DSCHRG MGMT >30: CPT

## 2020-09-30 PROCEDURE — 83735 ASSAY OF MAGNESIUM: CPT

## 2020-09-30 PROCEDURE — 71045 X-RAY EXAM CHEST 1 VIEW: CPT

## 2020-09-30 PROCEDURE — 84295 ASSAY OF SERUM SODIUM: CPT

## 2020-09-30 PROCEDURE — 86480 TB TEST CELL IMMUN MEASURE: CPT

## 2020-09-30 PROCEDURE — 85027 COMPLETE CBC AUTOMATED: CPT

## 2020-09-30 PROCEDURE — 96374 THER/PROPH/DIAG INJ IV PUSH: CPT | Mod: XU

## 2020-09-30 PROCEDURE — 82330 ASSAY OF CALCIUM: CPT

## 2020-09-30 PROCEDURE — 84100 ASSAY OF PHOSPHORUS: CPT

## 2020-09-30 PROCEDURE — U0003: CPT

## 2020-09-30 PROCEDURE — 83993 ASSAY FOR CALPROTECTIN FECAL: CPT

## 2020-09-30 PROCEDURE — 86140 C-REACTIVE PROTEIN: CPT

## 2020-09-30 RX ORDER — HYDROCORTISONE 20 MG
60 TABLET ORAL
Qty: 840 | Refills: 0
Start: 2020-09-30 | End: 2020-10-13

## 2020-09-30 RX ORDER — OXYCODONE HYDROCHLORIDE 5 MG/1
1 TABLET ORAL
Qty: 28 | Refills: 0
Start: 2020-09-30 | End: 2020-10-06

## 2020-09-30 RX ORDER — MESALAMINE 400 MG
1 TABLET, DELAYED RELEASE (ENTERIC COATED) ORAL
Qty: 0 | Refills: 0 | DISCHARGE

## 2020-09-30 RX ORDER — MESALAMINE 400 MG
1 TABLET, DELAYED RELEASE (ENTERIC COATED) ORAL
Qty: 14 | Refills: 0
Start: 2020-09-30 | End: 2020-10-13

## 2020-09-30 RX ORDER — MESALAMINE 400 MG
800 TABLET, DELAYED RELEASE (ENTERIC COATED) ORAL ONCE
Refills: 0 | Status: COMPLETED | OUTPATIENT
Start: 2020-09-30 | End: 2020-09-30

## 2020-09-30 RX ORDER — POTASSIUM PHOSPHATE, MONOBASIC POTASSIUM PHOSPHATE, DIBASIC 236; 224 MG/ML; MG/ML
30 INJECTION, SOLUTION INTRAVENOUS ONCE
Refills: 0 | Status: COMPLETED | OUTPATIENT
Start: 2020-09-30 | End: 2020-09-30

## 2020-09-30 RX ORDER — MESALAMINE 400 MG
0 TABLET, DELAYED RELEASE (ENTERIC COATED) ORAL
Qty: 0 | Refills: 0 | DISCHARGE

## 2020-09-30 RX ADMIN — OXYCODONE HYDROCHLORIDE 5 MILLIGRAM(S): 5 TABLET ORAL at 05:11

## 2020-09-30 RX ADMIN — OXYCODONE HYDROCHLORIDE 5 MILLIGRAM(S): 5 TABLET ORAL at 05:41

## 2020-09-30 RX ADMIN — OXYCODONE HYDROCHLORIDE 5 MILLIGRAM(S): 5 TABLET ORAL at 10:23

## 2020-09-30 RX ADMIN — Medication 81 MILLIGRAM(S): at 11:05

## 2020-09-30 RX ADMIN — Medication 800 MILLIGRAM(S): at 05:12

## 2020-09-30 RX ADMIN — PANTOPRAZOLE SODIUM 40 MILLIGRAM(S): 20 TABLET, DELAYED RELEASE ORAL at 05:11

## 2020-09-30 RX ADMIN — Medication 40 MILLIGRAM(S): at 05:11

## 2020-09-30 RX ADMIN — Medication 100 MILLIGRAM(S): at 05:12

## 2020-09-30 RX ADMIN — POTASSIUM PHOSPHATE, MONOBASIC POTASSIUM PHOSPHATE, DIBASIC 83.33 MILLIMOLE(S): 236; 224 INJECTION, SOLUTION INTRAVENOUS at 08:41

## 2020-09-30 RX ADMIN — SIMETHICONE 80 MILLIGRAM(S): 80 TABLET, CHEWABLE ORAL at 08:39

## 2020-09-30 RX ADMIN — OXYCODONE HYDROCHLORIDE 5 MILLIGRAM(S): 5 TABLET ORAL at 11:32

## 2020-09-30 NOTE — PROGRESS NOTE ADULT - PROBLEM SELECTOR PLAN 4
Transitions of Care Status:  1.  Name of PCP: Veto Todd  2.  PCP Contacted on Admission: [ ] Y    [x] N    3.  PCP contacted at Discharge: [ ] Y    [ ] N    [ ] N/A  4.  Post-Discharge Appointment Date and Location: d/c likely tomorrow to home  5.  Summary of Handoff given to PCP: Transitions of Care Status:  1.  Name of PCP: Veto Todd  2.  PCP Contacted on Admission: [ ] Y    [x] N    3.  PCP contacted at Discharge: [ ] Y    [ ] N    [ ] N/A  4.  Post-Discharge Appointment Date and Location: d/c likely today to home  5.  Summary of Handoff given to PCP:

## 2020-09-30 NOTE — PROGRESS NOTE ADULT - ASSESSMENT
· Assessment	  50 M w UC on Xeljanz p/w severe UC     Problem/Recommendation - 1:  Problem: Ulcerative colitis. Recommendation:Pt feels an improvement today. ESR CRP improved.  -Overall improved.  -d/c home on PO prednisone 40  -Xeljanz at prior dose  -Dr. Meeks to see in office within 7 days.       Problem/Recommendation - 2:  ·  Problem: CAD (coronary artery disease).  Recommendation: no objection to antiplatelets.      Problem/Recommendation - 3:  ·  Problem: Anemia.  Recommendation: due to UC.  DVT prophylaxis should be given despite anemia.     Attending Attestation:   Differential diagnosis and plan of care discussed with patient after the evaluation  35 Minutes spent on total encounter of which more than fifty percent of the encounter was spent counseling and/or coordinating care by the attending physician.        Brandon Gray M.D.   Gastroenterology and Hepatology  Cell: [phone] .

## 2020-09-30 NOTE — PROGRESS NOTE ADULT - PROBLEM SELECTOR PLAN 1
Symptoms remain controlled after transition to PO prednisone.  - c/w prednisone 40mg PO daily Symptoms remain controlled after transition to PO prednisone.  - c/w prednisone 40mg PO daily  - Awaiting GI recs - Xeljanz or another biologic?  - likely discharge today

## 2020-09-30 NOTE — PROGRESS NOTE ADULT - PROVIDER SPECIALTY LIST ADULT
Gastroenterology
Internal Medicine
Surgery
Internal Medicine

## 2020-09-30 NOTE — PROGRESS NOTE ADULT - ASSESSMENT
50M hx MI s/p MAGDA x3 (2014), ulcerative colitis (on Xeljanz), with lower abdominal pain x 6 weeks presenting with likely UC flare despite tx with multiple biological agents and oral Improved on IV steroids, transitioned to PO.    Plan:  - No urgent surgical intervention indicated at this time  - c/w PO steroids, appreciate GI reccs  - No indication for proctocolectomy on this admission as patient improving with steroids  - Please follow up with Dr. Dennison as an outpatient  - Surgery to sign off, please call back with any questions/concerns    [facility] [phone]

## 2020-09-30 NOTE — PROGRESS NOTE ADULT - PROBLEM SELECTOR PROBLEM 1
Ulcerative colitis with complication, unspecified location

## 2020-09-30 NOTE — PROGRESS NOTE ADULT - SUBJECTIVE AND OBJECTIVE BOX
Rashel Blake, PGY1  Internal Medicine  Pager #: [phone]    Patient is a 50y old  Male who presents with a chief complaint of abdominal pain (29 Sep 2020 09:17)      SUBJECTIVE / OVERNIGHT EVENTS: Pt refused heparin SQ at 5pm, refused Canasa at 11pm overnight. PO mesalamine given at 12am.  ADDITIONAL REVIEW OF SYSTEMS: 10 point ROS negative except as stated per HPI.    MEDICATIONS  (STANDING):  aspirin enteric coated 81 milliGRAM(s) Oral daily  heparin   Injectable 5000 Unit(s) SubCutaneous every 12 hours  hydrocortisone Enema 100 milliGRAM(s) Rectal daily  influenza   Vaccine 0.5 milliLiter(s) IntraMuscular once  melatonin 3 milliGRAM(s) Oral at bedtime  mesalamine Suppository 1000 milliGRAM(s) Rectal at bedtime  pantoprazole    Tablet 40 milliGRAM(s) Oral before breakfast  potassium phosphate IVPB 30 milliMole(s) IV Intermittent once  predniSONE   Tablet 40 milliGRAM(s) Oral daily    MEDICATIONS  (PRN):  hemorrhoidal Ointment 1 Application(s) Rectal two times a day PRN hemorrhoid  hydrocortisone hemorrhoidal Suppository 1 Suppository(s) Rectal two times a day PRN hemorrhoid  morphine  - Injectable 4 milliGRAM(s) IV Push every 4 hours PRN Severe Pain (7 - 10)  oxyCODONE    IR 5 milliGRAM(s) Oral every 4 hours PRN Moderate Pain (4 - 6)  simethicone 80 milliGRAM(s) Chew two times a day PRN Gas      CAPILLARY BLOOD GLUCOSE        I&O's Summary    29 Sep 2020 07:01  -  30 Sep 2020 07:00  --------------------------------------------------------  IN: 720 mL / OUT: 0 mL / NET: 720 mL        PHYSICAL EXAM:  Vital Signs Last 24 Hrs  T(C): 36.8 (30 Sep 2020 04:43), Max: 36.8 (29 Sep 2020 20:40)  T(F): 98.2 (30 Sep 2020 04:43), Max: 98.2 (29 Sep 2020 20:40)  HR: 76 (30 Sep 2020 04:43) (59 - 76)  BP: 121/72 (30 Sep 2020 04:43) (114/69 - 121/72)  BP(mean): --  RR: 18 (30 Sep 2020 04:43) (18 - 18)  SpO2: 99% (30 Sep 2020 04:43) (97% - 99%)  CONSTITUTIONAL: NAD, lying in bed comfortably  EYES: EOMI, PERRLA; conjunctiva and sclera clear  ENMT: Moist oral mucosa; normal dentition  NECK: Supple, no palpable masses  RESPIRATORY: Lungs clear to ascultation b/l; No rales, ronchi, or wheezing; Unlabored respirations  CARDIOVASCULAR: Regular rate and rhythm, normal S1 and S2, no murmurs, rubs, or gallops  ABDOMEN: Soft, nontender, nondistended, normal bowel sounds  MUSCULOSKELETAL: No joint swelling or tenderness to palpation  PSYCH: Affect appropriate  NEUROLOGY: AAOx3, CNs grossly intact  SKIN: No rashes; no palpable lesions    LABS:                        10.1   6.58  )-----------( 301      ( 30 Sep 2020 06:27 )             34.1     09-30    133<L>  |  97  |  17  ----------------------------<  83  4.0   |  27  |  1.01    Ca    8.6      30 Sep 2020 06:27  Phos  1.5     09-30  Mg     2.2     09-30    TPro  5.4<L>  /  Alb  2.7<L>  /  TBili  0.3  /  DBili  x   /  AST  17  /  ALT  52<H>  /  AlkPhos  43  09-30    PT/INR - ( 29 Sep 2020 07:51 )   PT: 13.4 sec;   INR: 1.14 ratio         PTT - ( 29 Sep 2020 07:51 )  PTT:26.5 sec            RADIOLOGY & ADDITIONAL TESTS: Rashel Blake, PGY1  Internal Medicine  Pager #: [phone]    Patient is a 50y old  Male who presents with a chief complaint of abdominal pain (29 Sep 2020 09:17)      SUBJECTIVE / OVERNIGHT EVENTS: Pt refused heparin SQ at 5pm, refused Canasa at 11pm overnight. PO mesalamine given instead. Pt reports no BM overnight, one BM with brown coloration, 5/10 pain. Pain now is 3/10. He feels PO oxycodone 5mg is sufficient for pain control. Feels ready to go home.  ADDITIONAL REVIEW OF SYSTEMS: 10 point ROS negative except as stated per HPI.    MEDICATIONS  (STANDING):  aspirin enteric coated 81 milliGRAM(s) Oral daily  heparin   Injectable 5000 Unit(s) SubCutaneous every 12 hours  hydrocortisone Enema 100 milliGRAM(s) Rectal daily  influenza   Vaccine 0.5 milliLiter(s) IntraMuscular once  melatonin 3 milliGRAM(s) Oral at bedtime  mesalamine Suppository 1000 milliGRAM(s) Rectal at bedtime  pantoprazole    Tablet 40 milliGRAM(s) Oral before breakfast  potassium phosphate IVPB 30 milliMole(s) IV Intermittent once  predniSONE   Tablet 40 milliGRAM(s) Oral daily    MEDICATIONS  (PRN):  hemorrhoidal Ointment 1 Application(s) Rectal two times a day PRN hemorrhoid  hydrocortisone hemorrhoidal Suppository 1 Suppository(s) Rectal two times a day PRN hemorrhoid  morphine  - Injectable 4 milliGRAM(s) IV Push every 4 hours PRN Severe Pain (7 - 10)  oxyCODONE    IR 5 milliGRAM(s) Oral every 4 hours PRN Moderate Pain (4 - 6)  simethicone 80 milliGRAM(s) Chew two times a day PRN Gas      CAPILLARY BLOOD GLUCOSE        I&O's Summary    29 Sep 2020 07:01  -  30 Sep 2020 07:00  --------------------------------------------------------  IN: 720 mL / OUT: 0 mL / NET: 720 mL        PHYSICAL EXAM:  Vital Signs Last 24 Hrs  T(C): 36.8 (30 Sep 2020 04:43), Max: 36.8 (29 Sep 2020 20:40)  T(F): 98.2 (30 Sep 2020 04:43), Max: 98.2 (29 Sep 2020 20:40)  HR: 76 (30 Sep 2020 04:43) (59 - 76)  BP: 121/72 (30 Sep 2020 04:43) (114/69 - 121/72)  BP(mean): --  RR: 18 (30 Sep 2020 04:43) (18 - 18)  SpO2: 99% (30 Sep 2020 04:43) (97% - 99%)  CONSTITUTIONAL: NAD, lying in bed comfortably  EYES: EOMI, PERRLA; conjunctiva and sclera clear  ENMT: Moist oral mucosa; normal dentition  NECK: Supple, no palpable masses  RESPIRATORY: Lungs clear to ascultation b/l; No rales, ronchi, or wheezing; Unlabored respirations  CARDIOVASCULAR: Regular rate and rhythm, normal S1 and S2, no murmurs, rubs, or gallops  ABDOMEN: Soft, nontender, nondistended, normal bowel sounds  MUSCULOSKELETAL: No joint swelling or tenderness to palpation  PSYCH: Affect appropriate  NEUROLOGY: AAOx3, CNs grossly intact  SKIN: No rashes; no palpable lesions    LABS:                        10.1   6.58  )-----------( 301      ( 30 Sep 2020 06:27 )             34.1     09-30    133<L>  |  97  |  17  ----------------------------<  83  4.0   |  27  |  1.01    Ca    8.6      30 Sep 2020 06:27  Phos  1.5     09-30  Mg     2.2     09-30    TPro  5.4<L>  /  Alb  2.7<L>  /  TBili  0.3  /  DBili  x   /  AST  17  /  ALT  52<H>  /  AlkPhos  43  09-30    PT/INR - ( 29 Sep 2020 07:51 )   PT: 13.4 sec;   INR: 1.14 ratio         PTT - ( 29 Sep 2020 07:51 )  PTT:26.5 sec            RADIOLOGY & ADDITIONAL TESTS: Rashel Blake, PGY1  Internal Medicine  Pager #: [phone]    Patient is a 50y old  Male who presents with a chief complaint of abdominal pain (29 Sep 2020 09:17)      SUBJECTIVE / OVERNIGHT EVENTS: Pt refused heparin SQ at 5pm, refused Canasa at 11pm overnight. PO mesalamine given instead. Pt reports no BM overnight, one BM with brown coloration, 5/10 pain. Pain now is 3/10. He feels PO oxycodone 5mg is sufficient for pain control. Feels ready to go home.  ADDITIONAL REVIEW OF SYSTEMS: 10 point ROS negative except as stated per HPI.    MEDICATIONS  (STANDING):  aspirin enteric coated 81 milliGRAM(s) Oral daily  heparin   Injectable 5000 Unit(s) SubCutaneous every 12 hours  hydrocortisone Enema 100 milliGRAM(s) Rectal daily  influenza   Vaccine 0.5 milliLiter(s) IntraMuscular once  melatonin 3 milliGRAM(s) Oral at bedtime  mesalamine Suppository 1000 milliGRAM(s) Rectal at bedtime  pantoprazole    Tablet 40 milliGRAM(s) Oral before breakfast  potassium phosphate IVPB 30 milliMole(s) IV Intermittent once  predniSONE   Tablet 40 milliGRAM(s) Oral daily    MEDICATIONS  (PRN):  hemorrhoidal Ointment 1 Application(s) Rectal two times a day PRN hemorrhoid  hydrocortisone hemorrhoidal Suppository 1 Suppository(s) Rectal two times a day PRN hemorrhoid  morphine  - Injectable 4 milliGRAM(s) IV Push every 4 hours PRN Severe Pain (7 - 10)  oxyCODONE    IR 5 milliGRAM(s) Oral every 4 hours PRN Moderate Pain (4 - 6)  simethicone 80 milliGRAM(s) Chew two times a day PRN Gas      CAPILLARY BLOOD GLUCOSE        I&O's Summary    29 Sep 2020 07:01  -  30 Sep 2020 07:00  --------------------------------------------------------  IN: 720 mL / OUT: 0 mL / NET: 720 mL        PHYSICAL EXAM:  Vital Signs Last 24 Hrs  T(C): 36.8 (30 Sep 2020 04:43), Max: 36.8 (29 Sep 2020 20:40)  T(F): 98.2 (30 Sep 2020 04:43), Max: 98.2 (29 Sep 2020 20:40)  HR: 76 (30 Sep 2020 04:43) (59 - 76)  BP: 121/72 (30 Sep 2020 04:43) (114/69 - 121/72)  BP(mean): --  RR: 18 (30 Sep 2020 04:43) (18 - 18)  SpO2: 99% (30 Sep 2020 04:43) (97% - 99%)  CONSTITUTIONAL: NAD, lying in bed comfortably  EYES: EOMI, PERRLA; conjunctiva and sclera clear  ENMT: Moist oral mucosa; normal dentition  NECK: Supple, no palpable masses  RESPIRATORY: Lungs clear to ascultation b/l; No rales, ronchi, or wheezing; Unlabored respirations  CARDIOVASCULAR: Regular rate and rhythm, normal S1 and S2, no murmurs, rubs, or gallops  ABDOMEN: Soft, mild tenderness on rebound in LLQ, mildly distended in lower abdomen, normal bowel sounds  MUSCULOSKELETAL: No joint swelling or tenderness to palpation  PSYCH: Affect appropriate  NEUROLOGY: AAOx3, CNs grossly intact  SKIN: No rashes; no palpable lesions    LABS:                        10.1   6.58  )-----------( 301      ( 30 Sep 2020 06:27 )             34.1     09-30    133<L>  |  97  |  17  ----------------------------<  83  4.0   |  27  |  1.01    Ca    8.6      30 Sep 2020 06:27  Phos  1.5     09-30  Mg     2.2     09-30    TPro  5.4<L>  /  Alb  2.7<L>  /  TBili  0.3  /  DBili  x   /  AST  17  /  ALT  52<H>  /  AlkPhos  43  09-30    PT/INR - ( 29 Sep 2020 07:51 )   PT: 13.4 sec;   INR: 1.14 ratio         PTT - ( 29 Sep 2020 07:51 )  PTT:26.5 sec            RADIOLOGY & ADDITIONAL TESTS:

## 2020-09-30 NOTE — PROGRESS NOTE ADULT - SUBJECTIVE AND OBJECTIVE BOX
Chief Complaint:  Patient is a 50y old  Male who presents with a chief complaint of abdominal pain (30 Sep 2020 08:28)      Interval Events:   only one formed BM    Allergies:  Entyvio (Swelling)  penicillin (Pruritus; Rash)  Remicade (Other)      Hospital Medications:  aspirin enteric coated 81 milliGRAM(s) Oral daily  hemorrhoidal Ointment 1 Application(s) Rectal two times a day PRN  heparin   Injectable 5000 Unit(s) SubCutaneous every 12 hours  hydrocortisone Enema 100 milliGRAM(s) Rectal daily  hydrocortisone hemorrhoidal Suppository 1 Suppository(s) Rectal two times a day PRN  influenza   Vaccine 0.5 milliLiter(s) IntraMuscular once  melatonin 3 milliGRAM(s) Oral at bedtime  mesalamine Suppository 1000 milliGRAM(s) Rectal at bedtime  morphine  - Injectable 4 milliGRAM(s) IV Push every 4 hours PRN  oxyCODONE    IR 5 milliGRAM(s) Oral every 4 hours PRN  pantoprazole    Tablet 40 milliGRAM(s) Oral before breakfast  predniSONE   Tablet 40 milliGRAM(s) Oral daily  simethicone 80 milliGRAM(s) Chew two times a day PRN      PMHX/PSHX:  MI (myocardial infarction)    CAD (coronary artery disease)    Ulcerative colitis    S/P knee surgery        Family history:      ROS:     General:  No wt loss, fevers, chills, night sweats, fatigue,   Eyes:  Good vision, no reported pain  ENT:  No sore throat, pain, runny nose, dysphagia  CV:  No pain, palpitations, hypo/hypertension  Resp:  No dyspnea, cough, tachypnea, wheezing  GI:  See HPI  :  No pain, bleeding, incontinence, nocturia  Muscle:  No pain, weakness  Neuro:  No weakness, tingling, memory problems  Psych:  No fatigue, insomnia, mood problems, depression  Endocrine:  No polyuria, polydipsia, cold/heat intolerance  Heme:  No petechiae, ecchymosis, easy bruisability  Skin:  No rash, edema      PHYSICAL EXAM:     GENERAL:  Appears stated age, well-groomed, well-nourished, no distress  HEENT:  NC/AT,  conjunctivae clear, sclera-anicteric  NECK: Trachea midline, supple  CHEST:  Full & symmetric excursion, no increased effort, breath sounds clear  HEART:  Regular rhythm, no manjeet/heave  ABDOMEN:  Soft, non-tender, non-distended, normoactive bowel sounds,  no masses ,no hepato-splenomegaly,   EXTREMITIES:  no cyanosis,clubbing or edema  SKIN:  No rash/erythema/petechiae, no jaundice  NEURO:  Alert, oriented, no asterixis  RECTAL: Deferred    Vital Signs:  Vital Signs Last 24 Hrs  T(C): 36.8 (30 Sep 2020 04:43), Max: 36.8 (30 Sep 2020 04:43)  T(F): 98.2 (30 Sep 2020 04:43), Max: 98.2 (30 Sep 2020 04:43)  HR: 76 (30 Sep 2020 04:43) (76 - 76)  BP: 121/72 (30 Sep 2020 04:43) (121/72 - 121/72)  BP(mean): --  RR: 18 (30 Sep 2020 04:43) (18 - 18)  SpO2: 99% (30 Sep 2020 04:43) (99% - 99%)  Daily     Daily     LABS:                        10.1   6.58  )-----------( 301      ( 30 Sep 2020 06:27 )             34.1     09-30    133<L>  |  97  |  17  ----------------------------<  83  4.0   |  27  |  1.01    Ca    8.6      30 Sep 2020 06:27  Phos  1.5     09-30  Mg     2.2     09-30    TPro  5.4<L>  /  Alb  2.7<L>  /  TBili  0.3  /  DBili  x   /  AST  17  /  ALT  52<H>  /  AlkPhos  43  09-30    LIVER FUNCTIONS - ( 30 Sep 2020 06:27 )  Alb: 2.7 g/dL / Pro: 5.4 g/dL / ALK PHOS: 43 U/L / ALT: 52 U/L / AST: 17 U/L / GGT: x           PT/INR - ( 30 Sep 2020 08:20 )   PT: 13.1 sec;   INR: 1.12 ratio         PTT - ( 30 Sep 2020 08:20 )  PTT:27.4 sec        Imaging:

## 2020-09-30 NOTE — PROGRESS NOTE ADULT - NUTRITIONAL ASSESSMENT
This patient has been assessed with a concern for Malnutrition and has been determined to have a diagnosis/diagnoses of Severe protein-calorie malnutrition.    This patient is being managed with:   Diet Low Fiber-  Lactose Restricted (Milk Sugar Intoler.)  Supplement Feeding Modality:  Oral  Ensure Clear Cans or Servings Per Day:  3       Frequency:  Daily  Entered: Sep 29 2020  2:04PM    

## 2020-09-30 NOTE — PROGRESS NOTE ADULT - ATTENDING COMMENTS
UC flare  -Improved w/ IV steroids  -Continue to monitor clinically  -No urgent need for surgery at this time  -will continue to monitor  -care per primary team  -GI for outpt therapy change and steroid ween
Ulcerative colitis flare  -See Full note from today on initial consult note (9/22).  -Surgical intervention for ulcerative colitis was discussed at length  -We'll continue to monitor closely  -Case discussed with gastroenterology
Pt seen and examined. Reports several episodes of bloody diarrhea overnight. Also c/o suprapubic abd pain. Reports improvement in abd bloating. Denies n/v, fever/chills. He is tolerating diet. On exam abd is soft, mild suprapubic tenderness. nondistended. Labs reviewed ; h/h stable. Will c/w solumedrol 20mg q8. GI following ; reccs appreciated.
pt seen and examined.  above plan discussed on rounds today.  In addition,    50M pmh CAD, MI s/p MAGDA x 3, TIA (June 2014 w/o complications), ulcerative colitis (dx'ed 2010, on Xeljanz and prednisone) who is presenting with abdominal pain 2/2 UC Flare  - GI PCR and C. Diff PCR are negative.  - stools are less bloody but patient is having increased lower abd pain with after bowel movements  - pt started on rescue solumedrol 20mg q8  - pt would like to try an advancement of his diet.  - GI f/u appreciated.
Pt seen and examined. Reports improvement in diarrhea. Now having brown bms.  Reports improvement in abd pain. . Denies n/v, fever/chills. He is tolerating diet. On exam abd is soft, mild suprapubic tenderness. nondistended. Labs reviewed ; h/h stable. Will c/w solumedrol 20mg q8. c/w hydrocortisone enema and mesalamine suppository per GI reccs.
pt seen and examined.  above plan discussed on rounds today.  In addition,    50M pmh CAD, MI s/p MAGDA x 3, TIA (June 2014 w/o complications), ulcerative colitis (dx'ed 2010, on Xeljanz and prednisone) who is presenting with abdominal pain 2/2 UC Flare  - will r/o infectious cause of flare: stool was sent for PCR and C.Diff.  - once infectious etiology has been ruled out then the patient will need further immuno suppression therapy with either IV solumedrol or cyclosporine.  - c/w supportive care: pain control, IV fluids  - check orthostatics to assess volume status.   - f/u GI regarding any further recommendation, i.e flex sig
pt seen and examined.  above plan discussed on rounds today.  In addition,    50M pmh CAD, MI s/p MAGDA x 3, TIA (June 2014 w/o complications), ulcerative colitis (dx'ed 2010, on Xeljanz and prednisone) who is presenting with abdominal pain 2/2 UC Flare  - clinically much  better. no blood in stools today, no melena  - c/w solumedrol 20mg q8  - pt tolerating diet  - will f/u with GI for further recommendations
pt seen and examined.  above plan discussed on rounds today.  In addition,    50M pmh CAD, MI s/p MAGDA x 3, TIA (June 2014 w/o complications), ulcerative colitis (dx'ed 2010, on Xeljanz and prednisone) who is presenting with abdominal pain 2/2 UC Flare  - flare is resolving. will transition to oral prednisone and monitor. if continues to do well will plan to discharge tomorrow.
pt seen and examined.  above plan discussed on rounds today.  In addition,    50M pmh CAD, MI s/p MAGDA x 3, TIA (June 2014 w/o complications), ulcerative colitis (dx'ed 2010, on Xeljanz and prednisone) who is presenting with abdominal pain 2/2 UC Flare  - flare is resolving  - tolerating oral prednisone  - stable for discharge today.  Discharge time spent: 32 min
pt seen and examined.  above plan discussed on rounds today.  In addition,    50M pmh CAD, MI s/p MAGDA x 3, TIA (June 2014 w/o complications), ulcerative colitis (dx'ed 2010, on Xeljanz and prednisone) who is presenting with abdominal pain 2/2 UC Flare  - GI PCR and C. Diff PCR are negative.  - clinically a little better. abd pain has improved, he has required less opioids. still with some abd pain post defecation.   - c/w solumedrol 20mg q8  - pt tolerating diet  - GI f/u appreciated- I discussed with Dr. Gray,  pt to be started on mesalamine and hydrocortisone enemas. will hold off on cyclosporine or biologics at this time.

## 2020-09-30 NOTE — PROGRESS NOTE ADULT - SUBJECTIVE AND OBJECTIVE BOX
RED SURGERY DAILY PROGRESS NOTE:       SUBJECTIVE/ROS: Patient seen and examined at bedside.  Patient reports some improvement in pain, transitioned to PO steroids.  Denies any n/v.         MEDICATIONS  (STANDING):  aspirin enteric coated 81 milliGRAM(s) Oral daily  heparin   Injectable 5000 Unit(s) SubCutaneous every 12 hours  hydrocortisone Enema 100 milliGRAM(s) Rectal daily  influenza   Vaccine 0.5 milliLiter(s) IntraMuscular once  melatonin 3 milliGRAM(s) Oral at bedtime  mesalamine Suppository 1000 milliGRAM(s) Rectal at bedtime  pantoprazole    Tablet 40 milliGRAM(s) Oral before breakfast  potassium phosphate IVPB 30 milliMole(s) IV Intermittent once  predniSONE   Tablet 40 milliGRAM(s) Oral daily    MEDICATIONS  (PRN):  hemorrhoidal Ointment 1 Application(s) Rectal two times a day PRN hemorrhoid  hydrocortisone hemorrhoidal Suppository 1 Suppository(s) Rectal two times a day PRN hemorrhoid  morphine  - Injectable 4 milliGRAM(s) IV Push every 4 hours PRN Severe Pain (7 - 10)  oxyCODONE    IR 5 milliGRAM(s) Oral every 4 hours PRN Moderate Pain (4 - 6)  simethicone 80 milliGRAM(s) Chew two times a day PRN Gas      OBJECTIVE:    Vital Signs Last 24 Hrs  T(C): 36.8 (30 Sep 2020 04:43), Max: 36.8 (29 Sep 2020 20:40)  T(F): 98.2 (30 Sep 2020 04:43), Max: 98.2 (29 Sep 2020 20:40)  HR: 76 (30 Sep 2020 04:43) (59 - 76)  BP: 121/72 (30 Sep 2020 04:43) (114/69 - 121/72)  BP(mean): --  RR: 18 (30 Sep 2020 04:43) (18 - 18)  SpO2: 99% (30 Sep 2020 04:43) (97% - 99%)        I&O's Detail    29 Sep 2020 07:01  -  30 Sep 2020 07:00  --------------------------------------------------------  IN:    Oral Fluid: 720 mL  Total IN: 720 mL    OUT:  Total OUT: 0 mL    Total NET: 720 mL          Daily     Daily Weight in k.4 (29 Sep 2020 12:24)    LABS:                        10.1   6.58  )-----------( 301      ( 30 Sep 2020 06:27 )             34.1     09-30    133<L>  |  97  |  17  ----------------------------<  83  4.0   |  27  |  1.01    Ca    8.6      30 Sep 2020 06:27  Phos  1.5     30  Mg     2.2     -30    TPro  5.4<L>  /  Alb  2.7<L>  /  TBili  0.3  /  DBili  x   /  AST  17  /  ALT  52<H>  /  AlkPhos  43  09-30    PT/INR - ( 29 Sep 2020 07:51 )   PT: 13.4 sec;   INR: 1.14 ratio         PTT - ( 29 Sep 2020 07:51 )  PTT:26.5 sec              PHYSICAL EXAM:  Constitutional: well developed, well nourished, NAD  Respiratory: No increased WOB  Abdomen: soft, non-distended, non-tender  Psychiatric: oriented x 3; appropriate

## 2020-10-01 LAB — CALPROTECTIN STL-MCNT: 5861 UG/G — HIGH (ref 0–120)

## 2020-10-12 PROBLEM — I25.10 ATHEROSCLEROTIC HEART DISEASE OF NATIVE CORONARY ARTERY WITHOUT ANGINA PECTORIS: Chronic | Status: ACTIVE | Noted: 2020-09-23

## 2020-10-12 PROBLEM — I21.9 ACUTE MYOCARDIAL INFARCTION, UNSPECIFIED: Chronic | Status: ACTIVE | Noted: 2020-09-23

## 2020-10-27 ENCOUNTER — APPOINTMENT (OUTPATIENT)
Dept: COLORECTAL SURGERY | Facility: CLINIC | Age: 50
End: 2020-10-27

## 2021-02-25 ENCOUNTER — TRANSCRIPTION ENCOUNTER (OUTPATIENT)
Age: 51
End: 2021-02-25

## 2021-09-21 ENCOUNTER — TRANSCRIPTION ENCOUNTER (OUTPATIENT)
Age: 51
End: 2021-09-21

## 2024-06-19 ENCOUNTER — TRANSCRIPTION ENCOUNTER (OUTPATIENT)
Age: 54
End: 2024-06-19

## 2025-03-13 NOTE — CONSULT NOTE ADULT - PROBLEM SELECTOR RECOMMENDATION 9
What Type Of Note Output Would You Prefer (Optional)?: Bullet Format How Severe Is Your Skin Lesion?: moderate Has Your Skin Lesion Been Treated?: not been treated Is This A New Presentation, Or A Follow-Up?: Skin Lesions Consistent with severe disease given very high frequency of BM's, significant anemia. Inflammatory markers markedly elevated. No signs of systemic toxicity or megacolon today. His physical exam is fairly benign. Cdiff negative. I reviewed his colonoscopy report from [place] showing severe left sided colitis and spoke with Dr. Garcia who informed me that there was no CMV on the biopsy. Patient failing oral steroids, will reattempt IV steroids, but if unsuccesful other options include adalmimumab (prior intolerance to infliximab) or cyclosporine, or colectomy.  -appreciate surgery input

## 2025-04-26 NOTE — ED PROVIDER NOTE - PR
Patient noted to have a percutaneous endoscopic gastrostomy tube in place. I have personally inspected the tube.Tube was placed prior to this admission There are no signs of drainage or infection around the site. The tube is patent. Medications have converted to liquid form if available.  Routine care to be done by wound care and nursing staff.        170